# Patient Record
Sex: FEMALE | Race: WHITE | NOT HISPANIC OR LATINO | Employment: FULL TIME | URBAN - METROPOLITAN AREA
[De-identification: names, ages, dates, MRNs, and addresses within clinical notes are randomized per-mention and may not be internally consistent; named-entity substitution may affect disease eponyms.]

---

## 2020-10-27 ENCOUNTER — TRANSFERRED RECORDS (OUTPATIENT)
Dept: HEALTH INFORMATION MANAGEMENT | Facility: CLINIC | Age: 55
End: 2020-10-27

## 2020-10-28 ENCOUNTER — TRANSFERRED RECORDS (OUTPATIENT)
Dept: HEALTH INFORMATION MANAGEMENT | Facility: CLINIC | Age: 55
End: 2020-10-28

## 2020-10-29 ENCOUNTER — TRANSFERRED RECORDS (OUTPATIENT)
Dept: HEALTH INFORMATION MANAGEMENT | Facility: CLINIC | Age: 55
End: 2020-10-29

## 2020-11-02 ENCOUNTER — TRANSFERRED RECORDS (OUTPATIENT)
Dept: HEALTH INFORMATION MANAGEMENT | Facility: CLINIC | Age: 55
End: 2020-11-02

## 2020-11-13 ENCOUNTER — TRANSFERRED RECORDS (OUTPATIENT)
Dept: HEALTH INFORMATION MANAGEMENT | Facility: CLINIC | Age: 55
End: 2020-11-13

## 2020-11-30 ENCOUNTER — TRANSFERRED RECORDS (OUTPATIENT)
Dept: HEALTH INFORMATION MANAGEMENT | Facility: CLINIC | Age: 55
End: 2020-11-30

## 2020-12-02 ENCOUNTER — TRANSFERRED RECORDS (OUTPATIENT)
Dept: HEALTH INFORMATION MANAGEMENT | Facility: CLINIC | Age: 55
End: 2020-12-02

## 2020-12-15 ENCOUNTER — TRANSFERRED RECORDS (OUTPATIENT)
Dept: HEALTH INFORMATION MANAGEMENT | Facility: CLINIC | Age: 55
End: 2020-12-15

## 2020-12-18 ENCOUNTER — TRANSFERRED RECORDS (OUTPATIENT)
Dept: HEALTH INFORMATION MANAGEMENT | Facility: CLINIC | Age: 55
End: 2020-12-18

## 2020-12-30 ENCOUNTER — TRANSFERRED RECORDS (OUTPATIENT)
Dept: HEALTH INFORMATION MANAGEMENT | Facility: CLINIC | Age: 55
End: 2020-12-30

## 2020-12-30 LAB
ALT SERPL-CCNC: 44 IU/L (ref 5–45)
AST SERPL-CCNC: 26 IU/L (ref 5–45)
CREATININE (EXTERNAL): 0.74 MG/DL (ref 0.4–1.1)
GFR ESTIMATED (EXTERNAL): 91 ML/MIN/1.73M2
GLUCOSE (EXTERNAL): 100 MG/DL (ref 70–99)
POTASSIUM (EXTERNAL): 4.2 MMOL/L (ref 3.4–5)

## 2021-01-20 ENCOUNTER — TRANSFERRED RECORDS (OUTPATIENT)
Dept: HEALTH INFORMATION MANAGEMENT | Facility: CLINIC | Age: 56
End: 2021-01-20

## 2021-01-20 LAB
ALT SERPL-CCNC: 44 IU/L (ref 5–45)
AST SERPL-CCNC: 27 IU/L (ref 5–45)
CREATININE (EXTERNAL): 0.64 MG/DL (ref 0.4–1.1)
GFR ESTIMATED (EXTERNAL): 100 ML/MIN/1.73M2
GLUCOSE (EXTERNAL): 94 MG/DL (ref 70–99)
POTASSIUM (EXTERNAL): 4 MMOL/L (ref 3.4–5)

## 2021-02-03 ENCOUNTER — TRANSFERRED RECORDS (OUTPATIENT)
Dept: HEALTH INFORMATION MANAGEMENT | Facility: CLINIC | Age: 56
End: 2021-02-03

## 2021-02-23 LAB
ALT SERPL-CCNC: 37 IU/L (ref 5–45)
AST SERPL-CCNC: 50 IU/L (ref 5–45)
CREATININE (EXTERNAL): 0.65 MG/DL (ref 0.4–1.1)
GFR ESTIMATED (EXTERNAL): 100 ML/MIN/1.73M2
GLUCOSE (EXTERNAL): 85 MG/DL (ref 70–99)
POTASSIUM (EXTERNAL): 4.2 MMOL/L (ref 3.4–5)

## 2021-03-24 LAB
ALT SERPL-CCNC: 65 IU/L (ref 5–45)
AST SERPL-CCNC: 55 IU/L (ref 5–45)
CREATININE (EXTERNAL): 0.72 MG/DL (ref 0.4–1.1)
GFR ESTIMATED (EXTERNAL): 94 ML/MIN/1.73M2
GLUCOSE (EXTERNAL): 101 MG/DL (ref 70–99)
POTASSIUM (EXTERNAL): 3.9 MMOL/L (ref 3.4–5)

## 2021-04-07 ENCOUNTER — TRANSFERRED RECORDS (OUTPATIENT)
Dept: HEALTH INFORMATION MANAGEMENT | Facility: CLINIC | Age: 56
End: 2021-04-07

## 2021-04-07 LAB
ALT SERPL-CCNC: 43 IU/L (ref 5–45)
AST SERPL-CCNC: 43 IU/L (ref 5–45)
CREATININE (EXTERNAL): 0.76 MG/DL (ref 0.4–1.1)
GFR ESTIMATED (EXTERNAL): 88 ML/MIN/1.73M2
GLUCOSE (EXTERNAL): 78 MG/DL (ref 70–99)
POTASSIUM (EXTERNAL): 3.7 MMOL/L (ref 3.4–5)

## 2021-05-24 LAB
ALT SERPL-CCNC: 37 IU/L (ref 5–45)
AST SERPL-CCNC: 41 IU/L (ref 5–45)
CREATININE (EXTERNAL): 3.7 MG/DL (ref 3.4–5)
GLUCOSE (EXTERNAL): 92 MG/DL (ref 70–99)
POTASSIUM (EXTERNAL): 3.7 MMOL/L (ref 3.4–5)

## 2021-06-04 LAB
ALT SERPL-CCNC: 44 IU/L (ref 5–45)
AST SERPL-CCNC: 57 IU/L (ref 5–45)
CREATININE (EXTERNAL): 0.62 MG/DL (ref 0.4–1.1)
GLUCOSE (EXTERNAL): 98 MG/DL (ref 70–99)
POTASSIUM (EXTERNAL): 3.8 MMOL/L (ref 3.4–5)

## 2021-07-02 LAB
ALT SERPL-CCNC: 24 IU/L (ref 5–45)
AST SERPL-CCNC: 37 IU/L (ref 5–45)
CREATININE (EXTERNAL): 0.67 MG/DL (ref 0.4–1.1)
GLUCOSE (EXTERNAL): 95 MG/DL (ref 70–99)
POTASSIUM (EXTERNAL): 3.8 MMOL/L (ref 3.4–5)

## 2021-08-09 ENCOUNTER — TRANSFERRED RECORDS (OUTPATIENT)
Dept: HEALTH INFORMATION MANAGEMENT | Facility: CLINIC | Age: 56
End: 2021-08-09

## 2021-11-17 ENCOUNTER — TRANSFERRED RECORDS (OUTPATIENT)
Dept: HEALTH INFORMATION MANAGEMENT | Facility: CLINIC | Age: 56
End: 2021-11-17

## 2021-11-19 ENCOUNTER — TRANSFERRED RECORDS (OUTPATIENT)
Dept: HEALTH INFORMATION MANAGEMENT | Facility: CLINIC | Age: 56
End: 2021-11-19

## 2021-11-19 LAB
ALT SERPL-CCNC: 32 IU/L (ref 5–45)
AST SERPL-CCNC: 35 IU/L (ref 5–45)
CREATININE (EXTERNAL): 0.78 MG/DL (ref 0.4–1.1)
GLUCOSE (EXTERNAL): 92 MG/DL (ref 70–99)
POTASSIUM (EXTERNAL): 4.3 MMOL/L (ref 3.4–5)

## 2022-02-11 ENCOUNTER — TRANSFERRED RECORDS (OUTPATIENT)
Dept: HEALTH INFORMATION MANAGEMENT | Facility: CLINIC | Age: 57
End: 2022-02-11

## 2022-02-11 LAB
ALT SERPL-CCNC: 27 IU/L (ref 5–45)
AST SERPL-CCNC: 32 IU/L (ref 5–45)
CREATININE (EXTERNAL): 0.88 MG/DL (ref 0.4–1.1)
GLUCOSE (EXTERNAL): 102 MG/DL (ref 70–99)
POTASSIUM (EXTERNAL): 4 MMOL/L (ref 3.4–5)

## 2022-11-18 ENCOUNTER — TRANSFERRED RECORDS (OUTPATIENT)
Dept: HEALTH INFORMATION MANAGEMENT | Facility: CLINIC | Age: 57
End: 2022-11-18

## 2022-11-18 LAB
ALT SERPL-CCNC: 35 IU/L (ref 5–45)
AST SERPL-CCNC: 32 IU/L (ref 5–45)
CREATININE (EXTERNAL): 0.83 MG/DL (ref 0.4–1.1)
GFR ESTIMATED (EXTERNAL): 78 ML/MIN/1.73M2
GLUCOSE (EXTERNAL): 81 MG/DL (ref 70–99)
POTASSIUM (EXTERNAL): 4.7 MMOL/L (ref 3.4–5)

## 2022-11-30 ENCOUNTER — TRANSFERRED RECORDS (OUTPATIENT)
Dept: HEALTH INFORMATION MANAGEMENT | Facility: CLINIC | Age: 57
End: 2022-11-30

## 2022-12-07 ENCOUNTER — TRANSFERRED RECORDS (OUTPATIENT)
Dept: HEALTH INFORMATION MANAGEMENT | Facility: CLINIC | Age: 57
End: 2022-12-07

## 2023-01-10 ENCOUNTER — TRANSFERRED RECORDS (OUTPATIENT)
Dept: HEALTH INFORMATION MANAGEMENT | Facility: CLINIC | Age: 58
End: 2023-01-10

## 2023-01-13 ENCOUNTER — TRANSFERRED RECORDS (OUTPATIENT)
Dept: HEALTH INFORMATION MANAGEMENT | Facility: CLINIC | Age: 58
End: 2023-01-13

## 2023-01-13 ENCOUNTER — TELEPHONE (OUTPATIENT)
Dept: SURGERY | Facility: CLINIC | Age: 58
End: 2023-01-13
Payer: COMMERCIAL

## 2023-01-13 ENCOUNTER — TRANSCRIBE ORDERS (OUTPATIENT)
Dept: OTHER | Age: 58
End: 2023-01-13

## 2023-01-13 ENCOUNTER — MEDICAL CORRESPONDENCE (OUTPATIENT)
Dept: HEALTH INFORMATION MANAGEMENT | Facility: CLINIC | Age: 58
End: 2023-01-13

## 2023-01-13 DIAGNOSIS — D69.6 THROMBOCYTOPENIA, UNSPECIFIED (H): ICD-10-CM

## 2023-01-13 DIAGNOSIS — C19 MALIGNANT NEOPLASM OF RECTOSIGMOID JUNCTION (H): Primary | ICD-10-CM

## 2023-01-13 NOTE — TELEPHONE ENCOUNTER
Records Requested    Facility  [facility]  Fax: [phone]   Outcome * 1/13/23 3:39 PM Faxed urg req to [facility] for additional records to be faxed to the clinic before scheduling. - Danya    * 1/17/23 9:27 AM Faxed 2nd urg req to [facility] for additional records. - Danya    * 1/18/23 1:16 PM Records received from [facility] and sent to HIM to be scanned into the chart. - Danya    [facility]:  12/7/22 - Pelvic Biopsy (Case: [id number])  8/9/21 - Colon Biopsy (Case: [id number])  10/27/20 - Colon Biopsy (Case: [id number])  11/18/22 - CEA (2.6 ng/ml)    11/30/22, 11/18/22 - ONC OV with Dr. Lundy  11/19/21 - ONC OV with Dorita Franz, NP   6/10/21 - RAD ONC OV with Dr. Orlando    8/9/21 - OP Note for ABDOMINOPERINEAL RESECTION with Dr. Thornton   10/27/20 - Colonoscopy      1/10/23 - MRI Pelvis  12/7/22 - CT Pelvic Biopsy  11/30/22 - PET/CT  11/16/22 - CT Chest/Abd/Pelvis

## 2023-01-18 ENCOUNTER — PATIENT OUTREACH (OUTPATIENT)
Dept: SURGERY | Facility: CLINIC | Age: 58
End: 2023-01-18
Payer: COMMERCIAL

## 2023-01-18 ENCOUNTER — TRANSFERRED RECORDS (OUTPATIENT)
Dept: HEALTH INFORMATION MANAGEMENT | Facility: CLINIC | Age: 58
End: 2023-01-18
Payer: COMMERCIAL

## 2023-01-18 NOTE — PROGRESS NOTES
Sarah has a history of rectal cancer s/p JULIA and APR. She now has a pelvic sidewall recurrence. She stated that she will be getting a port placed next week and starting chemo. She does not have a chemo start date.     Discussed with Dr. Chong who does not think she needs chemo. Scheduled her for 2/7/2023. She is getting her port placed on the 26th. Will have Dr. Chong review scans if possible and try and connect with her oncologist.

## 2023-01-19 NOTE — TELEPHONE ENCOUNTER
Diagnosis, Referred by & from: Rectal Cancer Recurrence in Pelvic Sidewall   Appt date: 2023   NOTES STATUS DETAILS   OFFICE NOTE from referring provider Received [facility]:  21 - ONC OV with Dorita Franz NP   OFFICE NOTE from other specialist Received [facility]:  22, 22 - ONC OV with Dr. Lundy  6/10/21 - RAD ONC OV with Dr. Orlando   DISCHARGE SUMMARY from hospital N/A    DISCHARGE REPORT from the ER N/A    OPERATIVE REPORT Received [facility]:  21 - OP Note for ABDOMINOPERINEAL RESECTION with Dr. Thornton   MEDICATION LIST Received    LABS     ANAL PAP/CEA Received [facility]:  22 - CEA   BIOPSIES/PATHOLOGY RELATED TO DIAGNOSIS Received [facility]:  22 - Pelvic Biopsy (Case: [id number])  21 - Colon Biopsy (Case: [id number])  10/27/20 - Colon Biopsy (Case: [id number])   DIAGNOSTIC PROCEDURES     COLONOSCOPY Received [facility]:  10/27/20 - Colonoscopy   IMAGING (DISC & REPORT)      CT Received [facility]:  22 - CT Pelvis  22 - PET  21 - CT Chest/Abd/Pelvis  21 - CT Chest/Abd/Pelvis  12/15/20 - CTA Chest  10/29/20 - CT Chest/Abd/Pelvis   MRI Received [facility]:  1/10/23 - MRI Pelvis  21 - MRI Pelvis  20 - MRI Abdomen  20 - MRI Pelvis     Records Requested  23    Facility  [facility] ([facility])  Fax: [phone]   Outcome * 23 9:58 AM Faxed urg req to [facility] for imaging disc to be Fed'Exd to the clinic. - Danya    * 23 1:26 PM Received imaging disc from [facility] sent to [facility]- and requested it be uploaded urgently. - Danya    * 23 8:24 AM Talked to uploader and they are working on un-ecyrpting the discs to get them uploaded into PACs. - Danya    Trackin     Action 23 [facility] 12:54PM   Action Taken  CSS unable to retreive images from disc. Discs sent to 2nd Floor to see if they are able to retreive images. Requestor updated about status.      Records Requested    [facility]  Fax: [phone]   Outcome * 23  10:52 AM Faxed urg req to [facility] for pathology to be Fed'Exd to the clinic. - Danya    Trackin

## 2023-01-30 ENCOUNTER — HOSPITAL ENCOUNTER (INPATIENT)
Dept: GENERAL RADIOLOGY | Facility: CLINIC | Age: 58
Discharge: HOME | End: 2023-01-30
Attending: SURGERY
Payer: COMMERCIAL

## 2023-01-30 DIAGNOSIS — C20 RECTAL ADENOCARCINOMA (H): ICD-10-CM

## 2023-01-30 DIAGNOSIS — C20 RECTAL ADENOCARCINOMA (H): Primary | ICD-10-CM

## 2023-01-30 PROCEDURE — 72197 MRI PELVIS W/O & W/DYE: CPT | Mod: 26 | Performed by: RADIOLOGY

## 2023-01-31 ENCOUNTER — MYC MEDICAL ADVICE (OUTPATIENT)
Dept: SURGERY | Facility: CLINIC | Age: 58
End: 2023-01-31
Payer: COMMERCIAL

## 2023-02-03 NOTE — TELEPHONE ENCOUNTER
Records received    February 3, 2023 11:47 AM  BENY9   Facility  [facility] - Pathology Dept   [address] [place] [address]  Ph. [phone] / fx. [phone]   Outcome Received path: 12/7/22 [id number]

## 2023-02-06 LAB
PATH REPORT.COMMENTS IMP SPEC: NORMAL
PATH REPORT.FINAL DX SPEC: NORMAL
PATH REPORT.GROSS SPEC: NORMAL
PATH REPORT.MICROSCOPIC SPEC OTHER STN: NORMAL
PATH REPORT.RELEVANT HX SPEC: NORMAL
PATH REPORT.RELEVANT HX SPEC: NORMAL
PATH REPORT.SITE OF ORIGIN SPEC: NORMAL

## 2023-02-06 PROCEDURE — 88321 CONSLTJ&REPRT SLD PREP ELSWR: CPT | Performed by: PATHOLOGY

## 2023-02-07 ENCOUNTER — PRE VISIT (OUTPATIENT)
Dept: SURGERY | Facility: CLINIC | Age: 58
End: 2023-02-07

## 2023-02-07 NOTE — PROGRESS NOTES
Colon and Rectal Surgery Clinic Note    RE: Sarah Portillo.  : 1965.  BENJAMIN: 2023.    Reason for visit: Recurrent rectal adenocarcinoma.     HPI: Sarah Portillo is a 57 year old female who presents today for a recurrence of her rectal adenocarcinoma. She was diagnosed with a mT3bN0 moderally differentiated distal rectal adenocarcinoma in 2020. MMR intact. She underwent JULIA with FOLFOX6 x 8 cycles followed by long course chemoradiotherapy. She sequently underwent abdominoperineal resection on 2021 by Dr. Chava Rosario in [place].  Postoperative recovery was largely uneventful.  Final pathology showed a 2.5 cm tumor with negative surgical margins (closest margin was 3.0 cm) with 0 out of 24 lymph nodes. During follow-up, she was diagnosed with a local recurrence in 2022. CT guided biopsy demonstrated metastatic adenocaricnoma consistent with colorectal primary in 2022. PD-L1 expressed in the setting of normal MMR expression was intermediate, Pan -TRK not expressed, HER 2 negative, and FISH analysis negative. She had a MRI pelvis on 1/10/2023 which revealed a right posterior pelvis sidewall/pelvic floor recurrence measuring 2.7 x 2.0 x 3.4cm with tethering of the cervix. PET scan on 2023 demonstrated a right posterolateral pelvic sidewall soft tissue mass measuring 2.2 cm with FDG update. Last CEA in 2022 was 2.6.     Sarah has a history of DVT and pulmonary embolism.  She was on warfarin for approximately 1 year and did undergo placement of an IVC filter for surgery but this has been since removed.  She is not on any anticoagulation at this time.         CEA (2022): 2.6       Surgical Pathology (2023):      PD-L1 expressed  Pan -TRK not expressed   HER 2 negative   FISH analysis negative       [facility] Pathology Consult:  CASE FROM [facility], [place], [place] ([id number], OBTAINED 2022):  Pelvic mass, CT-guided core  "biopsy:  -Metastatic moderately differentiated adenocarcinoma       MRI Pelvis (1/10/2023):        [facility] MRI overread:   2.4 cm heterogenous irregular enhancing soft tissue mass located  within right posterior deep pelvis adjacent to right internal iliac  vasculature with associated involvement of right pyriformis and ischio  gluteus musculature and tethering of cervix along the medial aspect.  Findings consistent with biopsy proven adenocarcinoma of colorectal  origin.      PET Scan (1/13/2023):      Medical history:  1. Hx of PE in 2020.     Surgical history:  1. APR on 8/9/2021.     Family history:  No family history on file.    Medications:  Current Outpatient Medications   Medication Sig Dispense Refill     levothyroxine (SYNTHROID/LEVOTHROID) 150 MCG tablet Take 1 tablet by mouth daily at 2 pm         Allergies:  No Known Allergies    Social history:   Social History     Tobacco Use     Smoking status: Never     Smokeless tobacco: Never   Substance Use Topics     Alcohol use: Not on file     Marital status: .    Physical Examination:  BP (!) 159/98 (BP Location: Right arm, Patient Position: Sitting, Cuff Size: Adult Large)   Pulse 65   Ht 6' 1\"   Wt 330 lb   SpO2 97%   BMI 43.54 kg/m    General: Well hydrated. No acute distress.  Abdomen: Soft, NT, left-sided ostomy viable and functioning. No masses or inguinal adenopathy palpated.   Perianal external examination:  Perianal skin: APR wound completely healed with no masses or nodularity.  No perineal hernia upon Valsalva.    ASSESSMENT  57 year old female with mT3bN0/pT3N0 distal rectal adenocarcinoma status post JULIA (systemic chemotherapy followed by long course chemoradiotherapy), followed by abdominoperineal resection with negative surgical margins and 0 out of 24 lymph nodes; now with biopsy-proven local recurrence 16 months after treatment. No clear evidence of distant metastatic disease. Local recurrence seems to be involving the right " pelvic sidewall and cervix. Risks, benefits, and alternatives of operative treatment were thoroughly discussed with the patient, he/she understands these well and agrees to proceed.  All questions were answered to her satisfaction.    PLAN  1.  We will discuss at multidisciplinary rectal cancer conference.  2.  Review outside imaging and pathology.  3.  Schedule bilateral lower extremity venous duplex at least 2 weeks before surgery.    4.  Schedule cystoscopy with bilateral ureteral stents, excision of pelvic recurrence, total abdominal hysterectomy with bilateral salpingo-oophorectomy.  Will need PAC and labs.    60 minutes spent on the date of the encounter doing chart review, history and exam, imaging review, documentation and further activities as noted above.    Long Gomez MD, MSc, FACS, FASCRS.    Chief, Division of Colon & Rectal Surgery  Stanley M. Goldberg, MD Endowed Chair, Colon & Rectal Surgery  Department of Surgery  [facility]      Referring Provider:  Chava Rosario MD    Primary Care Provider:  No primary care provider on file.    CC:  MD Fox Solis, DO

## 2023-02-12 ENCOUNTER — HEALTH MAINTENANCE LETTER (OUTPATIENT)
Age: 58
End: 2023-02-12

## 2023-02-13 ENCOUNTER — TUMOR CONFERENCE (OUTPATIENT)
Dept: ONCOLOGY | Facility: CLINIC | Age: 58
End: 2023-02-13
Payer: COMMERCIAL

## 2023-02-13 ENCOUNTER — OFFICE VISIT (OUTPATIENT)
Dept: SURGERY | Facility: CLINIC | Age: 58
End: 2023-02-13
Payer: COMMERCIAL

## 2023-02-13 VITALS
WEIGHT: 293 LBS | HEART RATE: 65 BPM | DIASTOLIC BLOOD PRESSURE: 98 MMHG | HEIGHT: 72 IN | OXYGEN SATURATION: 97 % | BODY MASS INDEX: 39.68 KG/M2 | SYSTOLIC BLOOD PRESSURE: 159 MMHG

## 2023-02-13 DIAGNOSIS — C19 MALIGNANT NEOPLASM OF RECTOSIGMOID JUNCTION (H): ICD-10-CM

## 2023-02-13 DIAGNOSIS — C20 LOCAL RECURRENCE OF RECTAL CANCER (H): ICD-10-CM

## 2023-02-13 DIAGNOSIS — D69.6 THROMBOCYTOPENIA, UNSPECIFIED (H): ICD-10-CM

## 2023-02-13 DIAGNOSIS — E66.01 MORBID OBESITY (H): ICD-10-CM

## 2023-02-13 DIAGNOSIS — C20 LOCAL RECURRENCE OF RECTAL CANCER (H): Primary | ICD-10-CM

## 2023-02-13 DIAGNOSIS — C20 RECTAL ADENOCARCINOMA (H): ICD-10-CM

## 2023-02-13 DIAGNOSIS — C19 MALIGNANT NEOPLASM OF RECTOSIGMOID JUNCTION (H): Primary | ICD-10-CM

## 2023-02-13 PROCEDURE — 99205 OFFICE O/P NEW HI 60 MIN: CPT | Performed by: COLON & RECTAL SURGERY

## 2023-02-13 RX ORDER — LEVOTHYROXINE SODIUM 150 UG/1
1 TABLET ORAL EVERY MORNING
COMMUNITY
Start: 2023-01-10

## 2023-02-13 ASSESSMENT — PAIN SCALES - GENERAL: PAINLEVEL: NO PAIN (0)

## 2023-02-13 NOTE — PATIENT INSTRUCTIONS
Follow up:  Schedule surgery with Dorita  [phone]  Pre-op physical and labs   No mechanical bowel prep      Please call with any questions or concerns regarding your clinic visit today.    It is a pleasure being involved in your health care.    Contacts post-consultation depending on your need:    Radiology Appointments [phone]    Schedule Clinic Appointments [phone] # 1   M-F 7:30 - 5 pm    JAMIE Chacon [phone]    Clinic Fax Number [phone]    Surgery Scheduling [phone]    My Chart is available 24 hours a day and is a secure way to access your records and communicate with your care team.  I strongly recommend signing up if you haven't already done so, if you are comfortable with computers.  If you would like to inquire about this or are having problems with My Chart access, you may call [phone] or go online at [email or web address].  Please allow at least 24 hours for a response and extra time on weekends and Holidays.

## 2023-02-13 NOTE — NURSING NOTE
"Chief Complaint   Patient presents with     Cancer     Rectal cancer       Vitals:    02/13/23 0850   BP: (!) 159/98   BP Location: Right arm   Patient Position: Sitting   Cuff Size: Adult Large   Pulse: 65   SpO2: 97%   Weight: 330 lb   Height: 6' 1\"       Body mass index is 43.54 kg/m .    Alethea Gutierrez CMA    "

## 2023-02-13 NOTE — LETTER
2023       RE: Sarah Portillo  [address]  [place] [address]     Dear Colleague,    Thank you for referring your patient, Sarah Portillo, to the [facility]ON AND RE[facility] [place] at [facility]. Please see a copy of my visit note below.    Colon and Rectal Surgery Clinic Note    RE: Sarah Portillo.  : 1965.  BENJAMIN: 2023.    Reason for visit: Recurrent rectal adenocarcinoma.     HPI: Sarah Portillo is a 57 year old female who presents today for a recurrence of her rectal adenocarcinoma. She was diagnosed with a mT3bN0 moderally differentiated distal rectal adenocarcinoma in 2020. MMR intact. She underwent JULIA with FOLFOX6 x 8 cycles followed by long course chemoradiotherapy. She sequently underwent abdominoperineal resection on 2021 by Dr. Chava Rosario in [place].  Postoperative recovery was largely uneventful.  Final pathology showed a 2.5 cm tumor with negative surgical margins (closest margin was 3.0 cm) with 0 out of 24 lymph nodes. During follow-up, she was diagnosed with a local recurrence in 2022. CT guided biopsy demonstrated metastatic adenocaricnoma consistent with colorectal primary in 2022. PD-L1 expressed in the setting of normal MMR expression was intermediate, Pan -TRK not expressed, HER 2 negative, and FISH analysis negative. She had a MRI pelvis on 1/10/2023 which revealed a right posterior pelvis sidewall/pelvic floor recurrence measuring 2.7 x 2.0 x 3.4cm with tethering of the cervix. PET scan on 2023 demonstrated a right posterolateral pelvic sidewall soft tissue mass measuring 2.2 cm with FDG update. Last CEA in 2022 was 2.6.     Sarah has a history of DVT and pulmonary embolism.  She was on warfarin for approximately 1 year and did undergo placement of an IVC filter for surgery but this has been since removed.  She is not on any anticoagulation at this time.    "      CEA (11/18/2022): 2.6       Surgical Pathology (12/7/2023):      PD-L1 expressed  Pan -TRK not expressed   HER 2 negative   FISH analysis negative       [facility] Pathology Consult:  CASE FROM [place], [place] ([id number], OBTAINED 12/07/2022):  Pelvic mass, CT-guided core biopsy:  -Metastatic moderately differentiated adenocarcinoma       MRI Pelvis (1/10/2023):        [facility] MRI overread:   2.4 cm heterogenous irregular enhancing soft tissue mass located  within right posterior deep pelvis adjacent to right internal iliac  vasculature with associated involvement of right pyriformis and ischio  gluteus musculature and tethering of cervix along the medial aspect.  Findings consistent with biopsy proven adenocarcinoma of colorectal  origin.      PET Scan (1/13/2023):      Medical history:  1. Hx of PE in 2020.     Surgical history:  1. APR on 8/9/2021.     Family history:  No family history on file.    Medications:  Current Outpatient Medications   Medication Sig Dispense Refill     levothyroxine (SYNTHROID/LEVOTHROID) 150 MCG tablet Take 1 tablet by mouth daily at 2 pm         Allergies:  No Known Allergies    Social history:   Social History     Tobacco Use     Smoking status: Never     Smokeless tobacco: Never   Substance Use Topics     Alcohol use: Not on file     Marital status: .    Physical Examination:  BP (!) 159/98 (BP Location: Right arm, Patient Position: Sitting, Cuff Size: Adult Large)   Pulse 65   Ht 6' 1\"   Wt 330 lb   SpO2 97%   BMI 43.54 kg/m    General: Well hydrated. No acute distress.  Abdomen: Soft, NT, left-sided ostomy viable and functioning. No masses or inguinal adenopathy palpated.   Perianal external examination:  Perianal skin: APR wound completely healed with no masses or nodularity.  No perineal hernia upon Valsalva.    ASSESSMENT  57 year old female with mT3bN0/pT3N0 distal rectal adenocarcinoma status post JULIA (systemic chemotherapy followed " by long course chemoradiotherapy), followed by abdominoperineal resection with negative surgical margins and 0 out of 24 lymph nodes; now with biopsy-proven local recurrence 16 months after treatment. No clear evidence of distant metastatic disease. Local recurrence seems to be involving the right pelvic sidewall and cervix. Risks, benefits, and alternatives of operative treatment were thoroughly discussed with the patient, he/she understands these well and agrees to proceed.  All questions were answered to her satisfaction.    PLAN  1.  We will discuss at multidisciplinary rectal cancer conference.  2.  Review outside imaging and pathology.  3.  Schedule bilateral lower extremity venous duplex at least 2 weeks before surgery.    4.  Schedule cystoscopy with bilateral ureteral stents, excision of pelvic recurrence, total abdominal hysterectomy with bilateral salpingo-oophorectomy.  Will need PAC and labs.    60 minutes spent on the date of the encounter doing chart review, history and exam, imaging review, documentation and further activities as noted above.    Long Gomez MD, MSc, FACS, FASCRS.    Chief, Division of Colon & Rectal Surgery  Stanley M. Goldberg, MD Endowed Chair, Colon & Rectal Surgery  Department of Surgery  [facility]      Referring Provider:  Chava Rosario MD    Primary Care Provider:  No primary care provider on file.    CC:  MD Fox Solis, DO        Again, thank you for allowing me to participate in the care of your patient.      Sincerely,    Long Gomez MD

## 2023-02-13 NOTE — LETTER
Date:February 13, 2023      Provider requested that no letter be sent. Do not send.       [facility]

## 2023-02-13 NOTE — TUMOR CONFERENCE
Tumor Conference Information  Tumor Conference: Colorectal  Specialties Present: Medical oncology, Radiation oncology, Pathology, Radiology, Surgery  Patient Status: Retrospective  Stage: rectal  Treatment to Date: Chemotherapy, Surgery, Radiation  Clinical Trials: Not discussed  Genetic Testing Discussed/Recommended?: No  Supportive Care Services Discussed/Recommended?: No  Recommended Plan: Follows evidence-based guidelines (Comment: surgery)  Did the review exceed 30 minutes?: did not           Documentation / Disclaimer Cancer Tumor Board Note  Cancer tumor board recommendations do not override what is determined to be reasonable care and treatment, which is dependent on the circumstances of a patient's case; the patient's medical, social, and personal concerns; and the clinical judgment of the oncologist [physician].

## 2023-02-14 ENCOUNTER — PREP FOR PROCEDURE (OUTPATIENT)
Dept: SURGERY | Facility: CLINIC | Age: 58
End: 2023-02-14
Payer: COMMERCIAL

## 2023-02-14 ENCOUNTER — PATIENT OUTREACH (OUTPATIENT)
Dept: ONCOLOGY | Facility: CLINIC | Age: 58
End: 2023-02-14
Payer: COMMERCIAL

## 2023-02-14 ENCOUNTER — TELEPHONE (OUTPATIENT)
Dept: SURGERY | Facility: CLINIC | Age: 58
End: 2023-02-14
Payer: COMMERCIAL

## 2023-02-14 DIAGNOSIS — C19 MALIGNANT NEOPLASM OF RECTOSIGMOID JUNCTION (H): Primary | ICD-10-CM

## 2023-02-14 NOTE — PROGRESS NOTES
New Patient Hematology / Oncology Nurse Navigator Note     Referral Date: 2/14/23    Referring provider: Per IB message from Tari//Dr. Gomez, combo surgery planned 3/9    Referring Clinic/Organization: [facility]     Referred to: GynOnc    Requested provider (if applicable): Dr. Viveros     Evaluation for : Multi-surgeon case: cystoscopy with bilateral ureteral stents, total abdominal hysterectomy with bilateral salpingo-oophorectomy       Clinical History (per Nurse review of records provided):      Office Visit yesterday with Dr. Gomez -- BOOKMARKED    1/30 PET/MR -- BOOKMARKED    Clinical Assessment / Barriers to Care (Per Nurse):  Pt lives in [place] ([place] hours away)      Records Location: [facility]     Records Needed:   N/A    Additional testing needed prior to consult:   N/A    Referral updates and Plan:   OUTGOING CALL to pt:  Introduced my role as nurse navigator with [facility] Hematology/Oncology dept and that we have recd the referral for surgical consult with Dr. Viveros.  Pt confirms they are aware of the referral and ready to schedule. Pt lives 5 hours away in [place] and will not be in the [facility] until the day before her surgery.  Provided contact information if future questions arise.     -Transferred pt to NPS line [phone] to schedule appt per scheduling instructions.    Will check with Dr. Viveros to see if she's licensed in [place] and if not patient can cross to [place] for virtual or come to [place] for in-person surgery consult.       Kelsey Sterling, BSN, RN, PHN, OCN  Hematology/Oncology Nurse Navigator  [facility]  [phone]

## 2023-02-15 NOTE — TELEPHONE ENCOUNTER
FUTURE VISIT INFORMATION      SURGERY INFORMATION:    Date: 3/9/23    Location: uu or    Surgeon:  Dima Arnold MD Gaertner, MD Jackeline Cheng, Bernadette Bird MD    Anesthesia Type:  general    Procedure: CYSTOSCOPY, WITH URETERAL STENT INSERTION LAPAROTOMY, OPEN EXCISION OF PELVIC FLOOR RECURRENCE HYSTERECTOMY, TOTAL, ABDOMINAL, WITH SALPINGO-OOPHORECTOMY    RECORDS REQUESTED FROM:       Pertinent Medical History: None

## 2023-02-17 ENCOUNTER — TRANSFERRED RECORDS (OUTPATIENT)
Dept: HEALTH INFORMATION MANAGEMENT | Facility: CLINIC | Age: 58
End: 2023-02-17
Payer: COMMERCIAL

## 2023-02-24 ENCOUNTER — TEAM CONFERENCE (OUTPATIENT)
Dept: SURGERY | Facility: CLINIC | Age: 58
End: 2023-02-24
Payer: COMMERCIAL

## 2023-02-24 NOTE — CONFIDENTIAL NOTE
COLON AND RECTAL SURGERY HUDDLE:    Patient was reviewed in preporation for their surgery the following was reviewed and has been completed:    Surgeon: Dr. Long Gomez    Surgery & Date: 3/9      Procedure Laterality Anesthesia   LAPAROTOMY, OPEN EXCISION OF PELVIC FLOOR RECURRENCE               Last MD Note: reviewed    Anesthesia Type: General    Other Providers: Yes    PAC: Yes3/8    WOC: No    Labs: Yes3/8    Bowel Prep: s none    Packet: Yes- need    Imaging: No    Post-Op Appointments: N/A    COVID: N/A    Pre op soap: Yes Questions about shower: no    Is patient on TPN?: No   If yes, I contacted the TPN pharmacist by paging the  pharmacy at [phone] or calling [phone]. I also contacted Shannon HERNANDEZ with inpatient colon and rectal team.     Pre op call complete: Yes

## 2023-02-24 NOTE — TELEPHONE ENCOUNTER
On 2/14/2023:  Spoke with patient via phone, completed the following scheduling, then later sent Surgery Packet to patient via MyChart and Mail including related scheduling information and instructions:    Your surgery is scheduled:    Date: Thursday March 9, 2023  ________________________________    Time: 7:30 AM*  ________________________________    Please arrive at:  5:30 AM*  ______________________    Surgeons' Names:  - Dr. Long Gomez  - Dr. Bernadette Church  with Urologist Dr. Arnold for Cystoscopy and Ureteral Stent Insertion  ____________________________________      Pre-Op Physical Fax Numbers:         [facility] Pre-Admissions  [facility] Fax:  [phone] / Phone:  [phone]        Your surgery is located at:      [facility]      [facility]      [address])      [place] [address]      [phone]      [email or web address]     *Times are tentative and may change. You can expect a call from the pre-admission nurses to confirm arrival and surgery start times if the times should change.     Required: Yes, you will need a  18 years or older to drive you home from your procedure as well as stay with you for 24 hours after your procedure, if you are discharged the same day as your procedure.    Surgery: CYSTOSCOPY, WITH URETERAL STENT INSERTION; LAPAROTOMY, OPEN EXCISION OF PELVIC FLOOR RECURRENCE; HYSTERECTOMY, TOTAL, ABDOMINAL, WITH SALPINGO-OOPHORECTOMY    Upcoming Related Appointments:     Mar 07, 2023  1:00 PM  New Patient with Bernadette Fields MD  [facility] ([facility] )   [phone]    [address] [place] [address]     Mar 08, 2023  1:30 PM  (Arrive by 1:15 PM)  PAC EVALUATION w/ DIDI Mckenna CNP  [facility]  [place] )   [phone]    [address] [facility] [address]     Mar 08, 2023  3:00 PM  LAB with [facility] LAB  [facility] ) [phone]    [address] [facility] [address]     Mar 29, 2023 10:00 AM  (Arrive by 9:45 AM)  Post Op with Diane Garrison PA-C  [facility] Colon and Rectal Surgery Clinic [facility] ) [phone]    [address] [facility] [address]     May 01, 2023  3:15 PM  (Arrive by 3:00 PM)  Post Op with Long Gomez MD  [facility] Colon and Rectal Surgery Clinic [place] [facility] ) [phone]    [address] [facility] [address]     NO BOWEL PREP

## 2023-02-27 NOTE — TELEPHONE ENCOUNTER
RECORDS STATUS - ALL OTHER DIAGNOSIS      RECORDS RECEIVED FROM: [facility], [facility]   DATE RECEIVED: 02/27/23   NOTES STATUS DETAILS   OFFICE NOTE from referring provider EPIC 02/13/23: Dr. Long Gomez   OFFICE NOTE from medical oncologist External: Rubi Khan 11/30/22: Dr. Pablo Lundy   OPERATIVE REPORT External: [facility] 08/09/21: robotic abdominoperineal resection   MEDICATION LIST [facility]    LABS     PATHOLOGY REPORTS Report in EPIC Path Consult:  02/06/23: [id number]   ANYTHING RELATED TO DIAGNOSIS External: [facility] Most recent 11/18/22   GENONOMIC TESTING     TYPE: External: [employer] 12/07/22: [id number]   IMAGING (NEED IMAGES & REPORT)     CT SCANS PACS 12/07/22: CT Soft Tissue  11/16/22-10/29/20: CT Chest Abd Pel   MRI PACS 07/12/21-11/02/20: MR Pelvis   PET PACS 11/30/21: PET CT Skull

## 2023-02-28 ENCOUNTER — TELEPHONE (OUTPATIENT)
Dept: UROLOGY | Facility: CLINIC | Age: 58
End: 2023-02-28
Payer: COMMERCIAL

## 2023-02-28 DIAGNOSIS — Z01.818 PREOPERATIVE EXAMINATION: Primary | ICD-10-CM

## 2023-02-28 NOTE — TELEPHONE ENCOUNTER
Pt called and notified that she will need to do a urine culture before her procedure.     Message routed to provider.

## 2023-03-02 ENCOUNTER — MEDICAL CORRESPONDENCE (OUTPATIENT)
Dept: HEALTH INFORMATION MANAGEMENT | Facility: CLINIC | Age: 58
End: 2023-03-02
Payer: COMMERCIAL

## 2023-03-02 ENCOUNTER — TELEPHONE (OUTPATIENT)
Dept: SURGERY | Facility: CLINIC | Age: 58
End: 2023-03-02
Payer: COMMERCIAL

## 2023-03-02 NOTE — TELEPHONE ENCOUNTER
Spoke to pt. Informed pt that culture can be done where she lives. She confirmed her local provider will place orders. Provided fax number to have results sent to.     Tiffany Reddy, MSN RN

## 2023-03-02 NOTE — TELEPHONE ENCOUNTER
Action 03/02/23 11:42 AM   Action Taken  * Imaging disc received from [facility] and sent to [facility] to be uploaded into PACs. - Danya

## 2023-03-02 NOTE — TELEPHONE ENCOUNTER
[facility] Call Center    Phone Message    May a detailed message be left on voicemail: yes     Reason for Call: The patient called back regarding urine culture before procedure next week. She says she has been waiting for a call back to know if she can have it done where she lives, but has not heard. Please contact patient. Thank you.     Action Taken: Message routed to:  [facility] ([facility] Urology    Travel Screening: Not Applicable

## 2023-03-03 ENCOUNTER — TRANSFERRED RECORDS (OUTPATIENT)
Dept: HEALTH INFORMATION MANAGEMENT | Facility: CLINIC | Age: 58
End: 2023-03-03
Payer: COMMERCIAL

## 2023-03-06 ENCOUNTER — PREP FOR PROCEDURE (OUTPATIENT)
Dept: SURGERY | Facility: CLINIC | Age: 58
End: 2023-03-06
Payer: COMMERCIAL

## 2023-03-06 ENCOUNTER — PREP FOR PROCEDURE (OUTPATIENT)
Dept: UROLOGY | Facility: CLINIC | Age: 58
End: 2023-03-06
Payer: COMMERCIAL

## 2023-03-06 DIAGNOSIS — C20 LOCAL RECURRENCE OF RECTAL CANCER (H): Primary | ICD-10-CM

## 2023-03-07 ENCOUNTER — PRE VISIT (OUTPATIENT)
Dept: ONCOLOGY | Facility: CLINIC | Age: 58
End: 2023-03-07
Payer: COMMERCIAL

## 2023-03-07 ENCOUNTER — ANESTHESIA EVENT (OUTPATIENT)
Dept: SURGERY | Facility: CLINIC | Age: 58
End: 2023-03-07
Payer: COMMERCIAL

## 2023-03-07 ENCOUNTER — ONCOLOGY VISIT (OUTPATIENT)
Dept: ONCOLOGY | Facility: CLINIC | Age: 58
End: 2023-03-07
Attending: COLON & RECTAL SURGERY
Payer: COMMERCIAL

## 2023-03-07 ENCOUNTER — PREP FOR PROCEDURE (OUTPATIENT)
Dept: ONCOLOGY | Facility: CLINIC | Age: 58
End: 2023-03-07

## 2023-03-07 VITALS
TEMPERATURE: 98.3 F | HEIGHT: 71 IN | WEIGHT: 293 LBS | RESPIRATION RATE: 18 BRPM | HEART RATE: 52 BPM | BODY MASS INDEX: 41.02 KG/M2 | OXYGEN SATURATION: 98 % | SYSTOLIC BLOOD PRESSURE: 135 MMHG | DIASTOLIC BLOOD PRESSURE: 88 MMHG

## 2023-03-07 DIAGNOSIS — C19 MALIGNANT NEOPLASM OF RECTOSIGMOID JUNCTION (H): ICD-10-CM

## 2023-03-07 LAB
ABO/RH(D): NORMAL
ANTIBODY SCREEN: NEGATIVE
SPECIMEN EXPIRATION DATE: NORMAL

## 2023-03-07 PROCEDURE — 99203 OFFICE O/P NEW LOW 30 MIN: CPT | Performed by: OBSTETRICS & GYNECOLOGY

## 2023-03-07 PROCEDURE — 99212 OFFICE O/P EST SF 10 MIN: CPT | Performed by: OBSTETRICS & GYNECOLOGY

## 2023-03-07 ASSESSMENT — PAIN SCALES - GENERAL: PAINLEVEL: NO PAIN (0)

## 2023-03-07 NOTE — PROGRESS NOTES
"Consult Notes on Referred Patient        Dr. Long Gomez MD  [address]  [place] [address]       RE: Sarah Portillo  : 1965  BENJAMIN: Mar 7, 2023    Dear Dr. Long Gomez:    I had the pleasure of seeing your patient Sarah Portillo here at the Gynecologic Cancer Clinic at the [facility] on 3/7/2023.  As you know she is a very pleasant 57 year old woman with a diagnosis of recurrent rectal adenocarcinoma.  Given these findings she was subsequently sent to the Gynecologic Cancer Clinic for new patient consultation.  She is accompanied today by her .      Patient initially diagnosed with rectal cancer in .  She underwent chemotherapy followed by chemoradiation therapy and surgery. In , she was unfornately diagnosed with an isolated recurrence at the right pelvic sidewall.  She has seen Dr Gomez who recommended surgical debulking.    23:  MRI pelvis (personally reviewed):  2.4 cm heterogenous irregular enhancing soft tissue mass located within right posterior deep pelvis adjacent to right internal iliac vasculature with associated involvement of right pyriformis and ischiogluteus musculature and tethering of cervix along the medial aspect. Findings consistent with biopsy proven adenocarcinoma of colorectal origin.    23: PET/CT (Personally reviewed):  Isolated recurrence in the pelvis (formal [facility] read pending)        Past Medical History:  Past Medical History:   Diagnosis Date     Colon cancer (H)      Hypothyroidism        Past Surgical History:  Past Surgical History:   Procedure Laterality Date     LOW ANTERIOR BOWEL RESECTION         Health Maintenance:  Last Pap Smear: No history of abnormal pap smears        Physical Exam:   /88 (Cuff Size: Thigh)   Pulse 52   Temp 98.3  F (36.8  C) (Tympanic)   Resp 18   Ht 1.803 m (5' 11\")   Wt (!) 151.5 kg (334 lb)   SpO2 98%   BMI 46.58 kg/m    Body " mass index is 46.58 kg/m .  GENERAL: Healthy, alert and no distress  EYES: Eyes grossly normal to inspection.  No discharge or erythema, or obvious scleral/conjunctival abnormalities.  RESP: No audible wheeze, cough, or visible cyanosis.  No visible retractions or increased work of breathing.    SKIN: Visible skin clear. No significant rash, abnormal pigmentation or lesions.  NEURO: Cranial nerves grossly intact.  Mentation and speech appropriate for age.  PSYCH: Mentation appears normal, affect normal/bright, judgement and insight intact, normal speech and appearance well-groomed.     Labs:  None      Assessment:    Sarah Portillo is a 57 year old woman with a diagnosis of recurrent rectal adenocarcinoma.     A total of 20 minutes was spent on patient care today.       Plan:     1.)    Recurrent rectal adenocarcinoma-pt is planned to undergo debulking procedure with Dr Gomez.  It is highly likely that this will require hysterectomy for adequate resection.  Given her age, I would also recommend removal of bilateral ovaries at the time of the procedure.  I will remain available to assist with the procedure if Dr Gomez deems it is appropriate to proceed.    2.) Pre-op teaching was completed today.        Thank you for allowing us to participate in the care of your patient.         Sincerely,    Bernadette Viveros MD  Gynecologic Oncology  [facility]       [employer] SPICER

## 2023-03-07 NOTE — LETTER
3/7/2023         RE: Sarah Portillo  [address]  [place] [address]        Dear Colleague,    Thank you for referring your patient, Sarah Portillo, to the [facility]. Please see a copy of my visit note below.    Consult Notes on Referred Patient        Dr. Long Gomez MD  [address]  [place] [address]       RE: Sarah Portillo  : 1965  BENJAMIN: Mar 7, 2023    Dear Dr. Long Gomez:    I had the pleasure of seeing your patient Sarah Portillo here at the Gynecologic Cancer Clinic at the [facility] on 3/7/2023.  As you know she is a very pleasant 57 year old woman with a diagnosis of recurrent rectal adenocarcinoma.  Given these findings she was subsequently sent to the Gynecologic Cancer Clinic for new patient consultation.  She is accompanied today by her .      Patient initially diagnosed with rectal cancer in .  She underwent chemotherapy followed by chemoradiation therapy and surgery. In , she was unfornately diagnosed with an isolated recurrence at the right pelvic sidewall.  She has seen Dr Gomez who recommended surgical debulking.    23:  MRI pelvis (personally reviewed):  2.4 cm heterogenous irregular enhancing soft tissue mass located within right posterior deep pelvis adjacent to right internal iliac vasculature with associated involvement of right pyriformis and ischiogluteus musculature and tethering of cervix along the medial aspect. Findings consistent with biopsy proven adenocarcinoma of colorectal origin.    23: PET/CT (Personally reviewed):  Isolated recurrence in the pelvis (formal [facility] read pending)        Past Medical History:  Past Medical History:   Diagnosis Date     Colon cancer (H)      Hypothyroidism        Past Surgical History:  Past Surgical History:   Procedure Laterality Date     LOW ANTERIOR BOWEL RESECTION         Health Maintenance:  Last  "Pap Smear: No history of abnormal pap smears        Physical Exam:   /88 (Cuff Size: Thigh)   Pulse 52   Temp 98.3  F (36.8  C) (Tympanic)   Resp 18   Ht 1.803 m (5' 11\")   Wt (!) 151.5 kg (334 lb)   SpO2 98%   BMI 46.58 kg/m    Body mass index is 46.58 kg/m .  GENERAL: Healthy, alert and no distress  EYES: Eyes grossly normal to inspection.  No discharge or erythema, or obvious scleral/conjunctival abnormalities.  RESP: No audible wheeze, cough, or visible cyanosis.  No visible retractions or increased work of breathing.    SKIN: Visible skin clear. No significant rash, abnormal pigmentation or lesions.  NEURO: Cranial nerves grossly intact.  Mentation and speech appropriate for age.  PSYCH: Mentation appears normal, affect normal/bright, judgement and insight intact, normal speech and appearance well-groomed.     Labs:  None      Assessment:    Sarah Portillo is a 57 year old woman with a diagnosis of recurrent rectal adenocarcinoma.     A total of 20 minutes was spent on patient care today.       Plan:     1.)    Recurrent rectal adenocarcinoma-pt is planned to undergo debulking procedure with Dr Gomez.  It is highly likely that this will require hysterectomy for adequate resection.  Given her age, I would also recommend removal of bilateral ovaries at the time of the procedure.  I will remain available to assist with the procedure if Dr Gomez deems it is appropriate to proceed.    2.) Pre-op teaching was completed today.        Thank you for allowing us to participate in the care of your patient.         Sincerely,    Bernadette Viveros MD  Gynecologic Oncology  [facility]       BARTOLOME SPICER        Again, thank you for allowing me to participate in the care of your patient.        Sincerely,        Donnie Viveros MD    "

## 2023-03-07 NOTE — NURSING NOTE
"Oncology Rooming Note    March 7, 2023 12:57 PM   Sarah Portillo is a 57 year old female who presents for:    Chief Complaint   Patient presents with     Oncology Clinic Visit     New patient-Malignant neoplasm of rectosigmoid junction       Initial Vitals: /88 (Cuff Size: Thigh)   Pulse 52   Temp 98.3  F (36.8  C) (Tympanic)   Resp 18   Ht 1.803 m (5' 11\")   Wt (!) 151.5 kg (334 lb)   SpO2 98%   BMI 46.58 kg/m   Estimated body mass index is 46.58 kg/m  as calculated from the following:    Height as of this encounter: 1.803 m (5' 11\").    Weight as of this encounter: 151.5 kg (334 lb). Body surface area is 2.75 meters squared.  No Pain (0) Comment: Data Unavailable   No LMP recorded. Patient is perimenopausal.  Allergies reviewed: Yes  Medications reviewed: Yes    Medications: Medication refills not needed today.  Pharmacy name entered into [facility]: [facility] [address] [facility] - [place], [place] - [address]    Clinical concerns: new patient       Bernadette Augustin CMA              "

## 2023-03-08 ENCOUNTER — LAB (OUTPATIENT)
Dept: LAB | Facility: CLINIC | Age: 58
End: 2023-03-08
Payer: COMMERCIAL

## 2023-03-08 ENCOUNTER — PRE VISIT (OUTPATIENT)
Dept: SURGERY | Facility: CLINIC | Age: 58
End: 2023-03-08

## 2023-03-08 ENCOUNTER — OFFICE VISIT (OUTPATIENT)
Dept: SURGERY | Facility: CLINIC | Age: 58
End: 2023-03-08
Payer: COMMERCIAL

## 2023-03-08 VITALS
TEMPERATURE: 97.8 F | DIASTOLIC BLOOD PRESSURE: 73 MMHG | BODY MASS INDEX: 41.02 KG/M2 | HEIGHT: 71 IN | WEIGHT: 293 LBS | SYSTOLIC BLOOD PRESSURE: 130 MMHG | OXYGEN SATURATION: 97 % | HEART RATE: 62 BPM

## 2023-03-08 DIAGNOSIS — C19 MALIGNANT NEOPLASM OF RECTOSIGMOID JUNCTION (H): ICD-10-CM

## 2023-03-08 DIAGNOSIS — C20 RECTAL ADENOCARCINOMA (H): ICD-10-CM

## 2023-03-08 DIAGNOSIS — C20 LOCAL RECURRENCE OF RECTAL CANCER (H): ICD-10-CM

## 2023-03-08 DIAGNOSIS — Z01.818 PREOP EXAMINATION: ICD-10-CM

## 2023-03-08 DIAGNOSIS — E66.01 MORBID OBESITY (H): ICD-10-CM

## 2023-03-08 DIAGNOSIS — Z01.818 PREOP EXAMINATION: Primary | ICD-10-CM

## 2023-03-08 DIAGNOSIS — D69.6 THROMBOCYTOPENIA, UNSPECIFIED (H): ICD-10-CM

## 2023-03-08 LAB
ALBUMIN SERPL BCG-MCNC: 4.2 G/DL (ref 3.5–5.2)
ALP SERPL-CCNC: 145 U/L (ref 35–104)
ALT SERPL W P-5'-P-CCNC: 26 U/L (ref 10–35)
ANION GAP SERPL CALCULATED.3IONS-SCNC: 9 MMOL/L (ref 7–15)
AST SERPL W P-5'-P-CCNC: 32 U/L (ref 10–35)
BASOPHILS # BLD AUTO: 0 10E3/UL (ref 0–0.2)
BASOPHILS NFR BLD AUTO: 1 %
BILIRUB SERPL-MCNC: 0.5 MG/DL
BUN SERPL-MCNC: 16.9 MG/DL (ref 6–20)
CALCIUM SERPL-MCNC: 9.4 MG/DL (ref 8.6–10)
CEA SERPL-MCNC: 4.8 NG/ML
CHLORIDE SERPL-SCNC: 105 MMOL/L (ref 98–107)
CREAT SERPL-MCNC: 0.84 MG/DL (ref 0.51–0.95)
DEPRECATED HCO3 PLAS-SCNC: 28 MMOL/L (ref 22–29)
EOSINOPHIL # BLD AUTO: 0.1 10E3/UL (ref 0–0.7)
EOSINOPHIL NFR BLD AUTO: 3 %
ERYTHROCYTE [DISTWIDTH] IN BLOOD BY AUTOMATED COUNT: 14 % (ref 10–15)
GFR SERPL CREATININE-BSD FRML MDRD: 81 ML/MIN/1.73M2
GLUCOSE SERPL-MCNC: 90 MG/DL (ref 70–99)
HCT VFR BLD AUTO: 40.3 % (ref 35–47)
HGB BLD-MCNC: 12.9 G/DL (ref 11.7–15.7)
IMM GRANULOCYTES # BLD: 0 10E3/UL
IMM GRANULOCYTES NFR BLD: 0 %
LYMPHOCYTES # BLD AUTO: 0.7 10E3/UL (ref 0.8–5.3)
LYMPHOCYTES NFR BLD AUTO: 22 %
MCH RBC QN AUTO: 27.3 PG (ref 26.5–33)
MCHC RBC AUTO-ENTMCNC: 32 G/DL (ref 31.5–36.5)
MCV RBC AUTO: 85 FL (ref 78–100)
MONOCYTES # BLD AUTO: 0.3 10E3/UL (ref 0–1.3)
MONOCYTES NFR BLD AUTO: 9 %
NEUTROPHILS # BLD AUTO: 2.2 10E3/UL (ref 1.6–8.3)
NEUTROPHILS NFR BLD AUTO: 65 %
NRBC # BLD AUTO: 0 10E3/UL
NRBC BLD AUTO-RTO: 0 /100
PLATELET # BLD AUTO: 146 10E3/UL (ref 150–450)
POTASSIUM SERPL-SCNC: 4.1 MMOL/L (ref 3.4–5.3)
PROT SERPL-MCNC: 7.1 G/DL (ref 6.4–8.3)
RBC # BLD AUTO: 4.72 10E6/UL (ref 3.8–5.2)
SODIUM SERPL-SCNC: 142 MMOL/L (ref 136–145)
WBC # BLD AUTO: 3.4 10E3/UL (ref 4–11)

## 2023-03-08 PROCEDURE — 86900 BLOOD TYPING SEROLOGIC ABO: CPT | Performed by: PATHOLOGY

## 2023-03-08 PROCEDURE — 84134 ASSAY OF PREALBUMIN: CPT | Performed by: PATHOLOGY

## 2023-03-08 PROCEDURE — 82378 CARCINOEMBRYONIC ANTIGEN: CPT | Performed by: PATHOLOGY

## 2023-03-08 PROCEDURE — 36415 COLL VENOUS BLD VENIPUNCTURE: CPT | Performed by: PATHOLOGY

## 2023-03-08 PROCEDURE — 99204 OFFICE O/P NEW MOD 45 MIN: CPT | Performed by: NURSE PRACTITIONER

## 2023-03-08 PROCEDURE — 86850 RBC ANTIBODY SCREEN: CPT | Performed by: PATHOLOGY

## 2023-03-08 PROCEDURE — 85025 COMPLETE CBC W/AUTO DIFF WBC: CPT | Performed by: PATHOLOGY

## 2023-03-08 PROCEDURE — 99000 SPECIMEN HANDLING OFFICE-LAB: CPT | Performed by: PATHOLOGY

## 2023-03-08 PROCEDURE — 86901 BLOOD TYPING SEROLOGIC RH(D): CPT | Performed by: PATHOLOGY

## 2023-03-08 PROCEDURE — 80053 COMPREHEN METABOLIC PANEL: CPT | Performed by: PATHOLOGY

## 2023-03-08 ASSESSMENT — PAIN SCALES - GENERAL: PAINLEVEL: NO PAIN (0)

## 2023-03-08 ASSESSMENT — ENCOUNTER SYMPTOMS
SEIZURES: 0
ORTHOPNEA: 0

## 2023-03-08 ASSESSMENT — LIFESTYLE VARIABLES: TOBACCO_USE: 0

## 2023-03-08 ASSESSMENT — COPD QUESTIONNAIRES: COPD: 0

## 2023-03-08 NOTE — H&P
Pre-Operative H & P     CC:  Preoperative exam to assess for increased cardiopulmonary risk while undergoing surgery and anesthesia.    Date of Encounter: 3/8/2023  Primary Care Physician:  No primary care provider on file.     Reason for visit:   Encounter Diagnoses   Name Primary?     Preop examination Yes     Malignant neoplasm of rectosigmoid junction (H)      Rectal adenocarcinoma (H)      Local recurrence of rectal cancer (H)        HPI  Sarah Portillo is a 57 year old female who presents for pre-operative H & P in preparation for  Procedure Information     Case: [id number] Date/Time: 03/09/23 0730    Procedures:       CYSTOSCOPY, WITH URETERAL STENT INSERTION (Bilateral: Urethra)      LAPAROTOMY, OPEN EXCISION OF PELVIC FLOOR RECURRENCE (Abdomen)      possible open removal of uterus, cervix, and both ovaries (Abdomen)    Anesthesia type: General with Block    Diagnosis:       Malignant neoplasm of rectosigmoid junction (H) [C19]      Rectal adenocarcinoma (H) [C20]      Local recurrence of rectal cancer (H) [C20]    Pre-op diagnosis:       Malignant neoplasm of rectosigmoid junction (H) [C19]      Rectal adenocarcinoma (H) [C20]      Local recurrence of rectal cancer (H) [C20]    [facility]  / [facility]    Providers: Catracho Connolly MD; Long Gomez MD; Bernadette Niño MD          Sarah Portillo is a 58 y/o female with a PMH significant for hypothyroidism, obesity, and a history of a pulmonary embolism who has a recurrent rectal adenocarcinoma. She was initially diagnosed in 10/2020 and underwent JULIA, chemo, radiation, and eventually a resection on 8/9/21 in [place].  During follow-up, she was diagnosed with a local recurrence in November 2022. CT guided biopsy demonstrated metastatic adenocaricnoma consistent with colorectal primary in December 2022. She had a MRI pelvis on 1/10/2023 which revealed a right posterior pelvis sidewall/pelvic floor recurrence with tethering of  the cervix. PET scan on 1/13/2023 demonstrated a right posterolateral pelvic sidewall soft tissue mass. She has consulted with Dr. Gomez on 2/13/23 and the above surgery has been recommneded for further managment.      History is obtained from the patient and chart review    Hx of abnormal bleeding or anti-platelet use: None.    Menstrual history: No LMP recorded. Patient is perimenopausal.:      Past Medical History  Past Medical History:   Diagnosis Date     Colon cancer (H)      History of pulmonary embolism 12/15/2020    Provoked by chemo and immobility. Was on warfarin x 1 year.     Hypothyroidism      Obesity        Past Surgical History  Past Surgical History:   Procedure Laterality Date     LOW ANTERIOR BOWEL RESECTION  08/09/2021    In [place]     SALPINGECTOMY      1998 d/t tubal pregnancy       Prior to Admission Medications  Current Outpatient Medications   Medication Sig Dispense Refill     levothyroxine (SYNTHROID/LEVOTHROID) 150 MCG tablet Take 1 tablet by mouth every morning         Allergies  No Known Allergies    Social History  Social History     Socioeconomic History     Marital status:      Spouse name: Not on file     Number of children: Not on file     Years of education: Not on file     Highest education level: Not on file   Occupational History     Not on file   Tobacco Use     Smoking status: Never     Smokeless tobacco: Never   Substance and Sexual Activity     Alcohol use: Never     Drug use: Never     Sexual activity: Not on file   Other Topics Concern     Not on file   Social History Narrative     Not on file     Social Determinants of Health     Financial Resource Strain: Not on file   Food Insecurity: Not on file   Transportation Needs: Not on file   Physical Activity: Not on file   Stress: Not on file   Social Connections: Not on file   Intimate Partner Violence: Not on file   Housing Stability: Not on file       Family History  Family History   Problem Relation Age of Onset  "    Venous thrombosis Nephew      Anesthesia Reaction No family hx of      Bleeding Disorder No family hx of        Review of Systems  The complete review of systems is negative other than noted in the HPI or here.   Anesthesia Evaluation   Pt has had prior anesthetic. Type: General.    No history of anesthetic complications       ROS/MED HX  ENT/Pulmonary:    (-) tobacco use, asthma, COPD and sleep apnea   Neurologic:    (-) no seizures, no CVA and migraines   Cardiovascular:     (+) -----Previous cardiac testing   Echo: Date: Results:    Stress Test: Date: Results:    ECG Reviewed: Date: 2020 Results:  Completed before surgery in 2020 in [place]. Denies any abnormality.   Cath: Date: Results:   (-) hypertension, CAD, CHF, SANDRA, orthopnea/PND, irregular heartbeat/palpitations, valvular problems/murmurs and dyslipidemia   METS/Exercise Tolerance:  Comment: Walking 20 mins daily and climbs 4 flights of stairs in home without and exertional symptoms   Hematologic: Comments: History of provoked DVT/PE (12/15/20) in [place]    (+) History of blood clots, pt is not anticoagulated, history of blood transfusion, no previous transfusion reaction, Known PRBC Anitbodies:No  (-) anemia   Musculoskeletal:  - neg musculoskeletal ROS     GI/Hepatic: Comment: Colon CA/recurrent rectal adenocarcinoma   (-) GERD and hepatitis   Renal/Genitourinary:    (-) renal disease and nephrolithiasis   Endo:     (+) thyroid problem, hypothyroidism, Obesity,  (-) Type II DM and chronic steroid usage   Psychiatric/Substance Use:  - neg psychiatric ROS     Infectious Disease:    (-) Recent Fever, MRSA, VRE, HIV and TB   Malignancy:   (+) Malignancy, History of Other.Other CA Recurrence of colon cancer  Active status post Radiation, Chemo and Surgery.    Other:            /73 (BP Location: Right arm, Patient Position: Sitting, Cuff Size: Adult Large)   Pulse 62   Temp 97.8  F (36.6  C) (Oral)   Ht 1.803 m (5' 11\")   Wt (!) 153.3 kg (338 lb)   " SpO2 97%   Breastfeeding No   BMI 47.14 kg/m      Physical Exam   Constitutional: Awake, alert, cooperative, no apparent distress, and appears stated age.  Eyes: Pupils equal, round and reactive to light, extra ocular muscles intact, sclera clear, conjunctiva normal.  HENT: Normocephalic, oral pharynx with moist mucus membranes, good dentition. No goiter appreciated.   Respiratory: Clear to auscultation bilaterally, no crackles or wheezing.  Cardiovascular: Regular rate and rhythm, normal S1 and S2, and no murmur noted.  Carotids +2, no bruits. No edema. Palpable pulses to radial and PT arteries.   GI: Normal bowel sounds, soft, non-distended, non-tender, no masses palpated, no hepatosplenomegaly.    Lymph/Hematologic: No cervical lymphadenopathy and no supraclavicular lymphadenopathy.  Genitourinary:  Deferred.  Skin: Warm and dry.   Musculoskeletal: Full ROM of neck. There is no redness, warmth, or swelling of the joints. Gross motor strength is normal.    Neurologic: Awake, alert, oriented to name, place and time. Cranial nerves II-XII are grossly intact. Gait is normal.   Neuropsychiatric: Calm, cooperative. Normal affect.     Prior Labs/Diagnostic Studies   All labs and imaging personally reviewed     EKG/ stress test - if available please see in ROS above     The patient's records and results personally reviewed by this provider.     Outside records reviewed from: Care Everywhere    LAB/DIAGNOSTIC STUDIES TODAY:      Component      Latest Ref Rng & Units 3/8/2023   WBC      4.0 - 11.0 10e3/uL 3.4 (L)   RBC Count      3.80 - 5.20 10e6/uL 4.72   Hemoglobin      11.7 - 15.7 g/dL 12.9   Hematocrit      35.0 - 47.0 % 40.3   MCV      78 - 100 fL 85   MCH      26.5 - 33.0 pg 27.3   MCHC      31.5 - 36.5 g/dL 32.0   RDW      10.0 - 15.0 % 14.0   Platelet Count      150 - 450 10e3/uL 146 (L)   % Neutrophils      % 65   % Lymphocytes      % 22   % Monocytes      % 9   % Eosinophils      % 3   % Basophils      % 1   %  Immature Granulocytes      % 0   NRBCs per 100 WBC      <1 /100 0   Absolute Neutrophils      1.6 - 8.3 10e3/uL 2.2   Absolute Lymphocytes      0.8 - 5.3 10e3/uL 0.7 (L)   Absolute Monocytes      0.0 - 1.3 10e3/uL 0.3   Absolute Eosinophils      0.0 - 0.7 10e3/uL 0.1   Absolute Basophils      0.0 - 0.2 10e3/uL 0.0   Absolute Immature Granulocytes      <=0.4 10e3/uL 0.0   Absolute NRBCs      10e3/uL 0.0   Sodium      136 - 145 mmol/L 142   Potassium      3.4 - 5.3 mmol/L 4.1   Chloride      98 - 107 mmol/L 105   Carbon Dioxide (CO2)      22 - 29 mmol/L 28   Anion Gap      7 - 15 mmol/L 9   Urea Nitrogen      6.0 - 20.0 mg/dL 16.9   Creatinine      0.51 - 0.95 mg/dL 0.84   Calcium      8.6 - 10.0 mg/dL 9.4   Glucose      70 - 99 mg/dL 90   Alkaline Phosphatase      35 - 104 U/L 145 (H)   AST      10 - 35 U/L 32   ALT      10 - 35 U/L 26   Protein Total      6.4 - 8.3 g/dL 7.1   Albumin      3.5 - 5.2 g/dL 4.2   Bilirubin Total      <=1.2 mg/dL 0.5   GFR Estimate      >60 mL/min/1.73m2 81       Assessment      Sarah Portillo is a 57 year old female seen as a PAC referral for risk assessment and optimization for anesthesia.    Plan/Recommendations  Pt will be optimized for the proposed procedure.  See below for details on the assessment, risk, and preoperative recommendations    NEUROLOGY  - No history of TIA, CVA or seizure  -Post Op delirium risk factors:  No risk identified    ENT  - No current airway concerns.  Will need to be reassessed day of surgery.  Mallampati: I  TM: > 3    CARDIAC  - No history of CAD, Hypertension and Afib  - METS (Metabolic Equivalents)  Patient performs 4 or more METS exercise without symptoms            Total Score: 0      RCRI-Very low risk: Class 1 0.4% complication rate            Total Score: 0        PULMONARY  NEW Low Risk            Total Score: 2    NEW: BMI over 35 kg/m2    NEW: Over 50 ys old      - Denies asthma or inhaler use  - Tobacco History      History   Smoking Status  "    Never   Smokeless Tobacco     Never       GI  - Rectal adenocarcinoma. Initially diagnosed 10/2020, underwent chemo, radiation, ad a resection on 8/9/21 in [place]. Recurrence diagnosed on 11/2022. Surgery planned as above.   PONV High Risk  Total Score: 3           1 AN PONV: Pt is Female    1 AN PONV: Patient is not a current smoker    1 AN PONV: Intended Post Op Opioids        /RENAL  - Baseline Creatinine 0.87    ENDOCRINE    - BMI: Estimated body mass index is 47.14 kg/m  as calculated from the following:    Height as of this encounter: 1.803 m (5' 11\").    Weight as of this encounter: 153.3 kg (338 lb).  Class 3 Obesity (BMI > 40)  - No history of Diabetes Mellitus  - Thyroid disorder  Take levothyroxine with a sip of water on the morning of surgery. Continue home replacement while hospitalized.    HEME  VTE High Risk 3%            Total Score: 9    VTE: BMI greater than 39    VTE: Pt history of VTE    VTE: Current cancer      - No history of abnormal bleeding or antiplatelet use.   -History of provoked DVT/PE (12/15/20) in [place]. It was felt it was due to chemo and a long 5 hour car ride. Was on warfain x 1 year and had IVC filter placed (now removed). Not currently anticoagulated. A bilateral LE ultrasound to assess for DVTs ordered by Dr. Gomez and completed in [place] on 2/17/23. It was negative for DVTs.     Different anesthesia methods/types have been discussed with the patient, but they are aware that the final plan will be decided by the assigned anesthesia provider on the date of service.      The patient is optimized for their procedure. AVS with information on surgery time/arrival time, meds and NPO status given by nursing staff. No further diagnostic testing indicated.      On the day of service:     Prep time: 20 minutes  Visit time: 15 minutes  Documentation time: 15 minutes  ------------------------------------------  Total time: 50 minutes      DIDI Lara CNP (supervised by Cassie " DIDI Chery CNP)  Preoperative Assessment Center  [facility]  Phone: [phone]  Fax: [phone]

## 2023-03-08 NOTE — PATIENT INSTRUCTIONS
Preparing for Your Surgery      Name:  Sarah Portillo   MRN:  [id number]   :  1965   Today's Date:  3/8/2023       Arriving for surgery:  Surgery date:  3/9/23  Arrival time:  05:30 AM     Surgeries and procedures: Adult patients can have 2 visitors all through the surgery process.     Visiting hours: 8 a.m. to 8:30 p.m.     Hospital: Adult patients and children under age 18 can have 4 visitor at a time     No visitors under the age of 5 are allowed for hospital patients.  Double occupancy rooms: Patients can have only two visitors at a time.     Patients with disabilities: Can have a support person with them (family member, service provider     Or someone well informed about their needs) plus the allowed number of visitors     Patients confirmed or suspected to have symptoms of COVID 19 or flu:     No visitors allowed for adult patients.   Children (under age 18) can have 1 named visitor.     People who are sick or showing symptoms of COVID 19 or flu:    Are not allowed to visit patients--we can only make exceptions in special situations.       Please follow these guidelines for your visit:   Arrive wearing a mask over your mouth and nose; we will give you a medical mask to wear    If you arrive wearing a cloth mask.   Keep it on during your entire visit, even when in patient's room.   If you don't wear a mask we'll ask you to leave.     Clean your hands with alcohol hand . Do this when you arrive at and leave the building and patient room,    And again after you touch your mask or anything in the room.     You can t visit if you have a fever, cough, shortness of breath, muscle aches, headaches, sore throat    Or diarrhea      Stay 6 feet away from others during your visit and between visits     Go directly to and from the room you are visiting.     Stay in the patient s room during your visit. Limit going to other places in the hospital as much as possible     Leave bags and jackets at home or in  the car.     For everyone s health, please don t come and go during your visit. That includes for smoking   during your visit.     Please come to:     [facility] [address]  [address] [place]  [address]    - ? parking is available in front of the hospital      -    Please proceed to [address] on the 3rd floor. [phone]?     - ?If you are in need of directions, wheelchair or escort please stop at the Information Desk in the lobby.  Inform the information person that you are here for surgery; a wheelchair and escort to [address] will be provided.?     What can I eat or drink?  -  You may eat and drink normally up to 8 hours prior to arrival time. (Until 09:30 PM the evening prior to your surgery)  -  You may have clear liquids until 2 hours prior to arrival time. (Until 03:30 AM)    Examples of clear liquids:  Water  Clear broth  Juices (apple, white grape, white cranberry  and cider) without pulp  Noncarbonated, powder based beverages  (lemonade and Jaya-Aid)  Sodas (Sprite, 7-Up, ginger ale and seltzer)  Coffee or tea (without milk or cream)  Gatorade    -  No Alcohol for at least 24 hours before surgery.     Which medicines can I take?    Hold Aspirin for 7 days before surgery.   Hold Multivitamins for 7 days before surgery.  Hold Supplements for 7 days before surgery.  Hold Ibuprofen (Advil, Motrin) for 1 day(s) before surgery--unless otherwise directed by surgeon.  Hold Naproxen (Aleve) for 4 days before surgery    -  PLEASE TAKE these medications the day of surgery:  Levothyroxine (Synthroid)    How do I prepare myself?  - Please take 2 showers (one the night prior to surgery and one the morning of surgery) using Scrubcare or Hibiclens soap.    Use this soap only from the neck to your toes.     Leave the soap on your skin for one minute--then rinse thoroughly.      You may use your own shampoo and conditioner. No other hair products.   -  Please remove all jewelry and body piercings.  - No lotions, deodorants or fragrance.  - No makeup or fingernail polish.   - Bring your ID and insurance card.    -If you have a Deep Brain Stimulator, Spinal Cord Stimulator, or any Neuro Stimulator device---you must bring the remote control to the hospital.      ALL PATIENTS GOING HOME THE SAME DAY OF SURGERY ARE REQUIRED TO HAVE A RESPONSIBLE ADULT TO DRIVE AND BE IN ATTENDANCE WITH THEM FOR 24 HOURS FOLLOWING SURGERY.    Covid testing policy as of 12/06/2022  Your surgeon will notify and schedule you for a COVID test if one is needed before surgery--please direct any questions or COVID symptoms to your surgeon      Questions or Concerns:    - For any questions regarding the day of surgery or your hospital stay, please contact the Pre Admission Nursing Office at [phone].       - If you have health changes between today and your surgery, please call your surgeon.       - For questions after surgery, please call your surgeons office.

## 2023-03-09 ENCOUNTER — HOSPITAL ENCOUNTER (INPATIENT)
Facility: CLINIC | Age: 58
LOS: 5 days | Discharge: HOME | End: 2023-03-14
Attending: UROLOGY | Admitting: COLON & RECTAL SURGERY
Payer: COMMERCIAL

## 2023-03-09 ENCOUNTER — ANESTHESIA (OUTPATIENT)
Dept: SURGERY | Facility: CLINIC | Age: 58
End: 2023-03-09
Payer: COMMERCIAL

## 2023-03-09 DIAGNOSIS — C20 LOCAL RECURRENCE OF RECTAL CANCER (H): Primary | ICD-10-CM

## 2023-03-09 LAB
PREALB SERPL IA-MCNC: 23 MG/DL (ref 15–45)
SARS-COV-2 RNA RESP QL NAA+PROBE: NEGATIVE

## 2023-03-09 PROCEDURE — 49203 PR EXCISION/DESTRUCTION OPEN ABDOMINAL TUMORS 5 CM: CPT | Performed by: COLON & RECTAL SURGERY

## 2023-03-09 PROCEDURE — 250N000011 HC RX IP 250 OP 636: Performed by: NURSE ANESTHETIST, CERTIFIED REGISTERED

## 2023-03-09 PROCEDURE — 250N000009 HC RX 250: Performed by: NURSE ANESTHETIST, CERTIFIED REGISTERED

## 2023-03-09 PROCEDURE — C1765 ADHESION BARRIER: HCPCS | Performed by: UROLOGY

## 2023-03-09 PROCEDURE — 07TC0ZZ RESECTION OF PELVIS LYMPHATIC, OPEN APPROACH: ICD-10-PCS | Performed by: OBSTETRICS & GYNECOLOGY

## 2023-03-09 PROCEDURE — 710N000010 HC RECOVERY PHASE 1, LEVEL 2, PER MIN: Performed by: UROLOGY

## 2023-03-09 PROCEDURE — 88305 TISSUE EXAM BY PATHOLOGIST: CPT | Mod: TC | Performed by: COLON & RECTAL SURGERY

## 2023-03-09 PROCEDURE — 88305 TISSUE EXAM BY PATHOLOGIST: CPT | Mod: 26 | Performed by: PATHOLOGY

## 2023-03-09 PROCEDURE — 88307 TISSUE EXAM BY PATHOLOGIST: CPT | Mod: 26 | Performed by: PATHOLOGY

## 2023-03-09 PROCEDURE — 250N000011 HC RX IP 250 OP 636: Performed by: ANESTHESIOLOGY

## 2023-03-09 PROCEDURE — 88331 PATH CONSLTJ SURG 1 BLK 1SPC: CPT | Mod: TC | Performed by: COLON & RECTAL SURGERY

## 2023-03-09 PROCEDURE — 0UT20ZZ RESECTION OF BILATERAL OVARIES, OPEN APPROACH: ICD-10-PCS | Performed by: OBSTETRICS & GYNECOLOGY

## 2023-03-09 PROCEDURE — 370N000017 HC ANESTHESIA TECHNICAL FEE, PER MIN: Performed by: UROLOGY

## 2023-03-09 PROCEDURE — 250N000011 HC RX IP 250 OP 636: Performed by: COLON & RECTAL SURGERY

## 2023-03-09 PROCEDURE — 360N000084 HC SURGERY LEVEL 4 W/ FLUORO, PER MIN: Performed by: UROLOGY

## 2023-03-09 PROCEDURE — 999N000141 HC STATISTIC PRE-PROCEDURE NURSING ASSESSMENT: Performed by: UROLOGY

## 2023-03-09 PROCEDURE — C9290 INJ, BUPIVACAINE LIPOSOME: HCPCS | Performed by: ANESTHESIOLOGY

## 2023-03-09 PROCEDURE — 272N000001 HC OR GENERAL SUPPLY STERILE: Performed by: UROLOGY

## 2023-03-09 PROCEDURE — 0UT70ZZ RESECTION OF BILATERAL FALLOPIAN TUBES, OPEN APPROACH: ICD-10-PCS | Performed by: OBSTETRICS & GYNECOLOGY

## 2023-03-09 PROCEDURE — 258N000003 HC RX IP 258 OP 636: Performed by: NURSE ANESTHETIST, CERTIFIED REGISTERED

## 2023-03-09 PROCEDURE — 258N000003 HC RX IP 258 OP 636: Performed by: COLON & RECTAL SURGERY

## 2023-03-09 PROCEDURE — 49905 OMENTAL FLAP INTRA-ABDOM: CPT | Performed by: COLON & RECTAL SURGERY

## 2023-03-09 PROCEDURE — 52005 CYSTO W/URTRL CATHJ: CPT | Performed by: UROLOGY

## 2023-03-09 PROCEDURE — 250N000013 HC RX MED GY IP 250 OP 250 PS 637: Performed by: COLON & RECTAL SURGERY

## 2023-03-09 PROCEDURE — C1758 CATHETER, URETERAL: HCPCS | Performed by: UROLOGY

## 2023-03-09 PROCEDURE — 88331 PATH CONSLTJ SURG 1 BLK 1SPC: CPT | Mod: 26 | Performed by: PATHOLOGY

## 2023-03-09 PROCEDURE — 0JXC0ZB TRANSFER PELVIC REGION SUBCUTANEOUS TISSUE AND FASCIA WITH SKIN AND SUBCUTANEOUS TISSUE, OPEN APPROACH: ICD-10-PCS | Performed by: OBSTETRICS & GYNECOLOGY

## 2023-03-09 PROCEDURE — 0UT90ZZ RESECTION OF UTERUS, OPEN APPROACH: ICD-10-PCS | Performed by: OBSTETRICS & GYNECOLOGY

## 2023-03-09 PROCEDURE — 120N000002 HC R&B MED SURG/OB UMMC

## 2023-03-09 PROCEDURE — C1769 GUIDE WIRE: HCPCS | Performed by: UROLOGY

## 2023-03-09 PROCEDURE — 0T788DZ DILATION OF BILATERAL URETERS WITH INTRALUMINAL DEVICE, VIA NATURAL OR ARTIFICIAL OPENING ENDOSCOPIC: ICD-10-PCS | Performed by: OBSTETRICS & GYNECOLOGY

## 2023-03-09 PROCEDURE — 250N000025 HC SEVOFLURANE, PER MIN: Performed by: UROLOGY

## 2023-03-09 PROCEDURE — U0003 INFECTIOUS AGENT DETECTION BY NUCLEIC ACID (DNA OR RNA); SEVERE ACUTE RESPIRATORY SYNDROME CORONAVIRUS 2 (SARS-COV-2) (CORONAVIRUS DISEASE [COVID-19]), AMPLIFIED PROBE TECHNIQUE, MAKING USE OF HIGH THROUGHPUT TECHNOLOGIES AS DESCRIBED BY CMS-2020-01-R: HCPCS | Performed by: UROLOGY

## 2023-03-09 RX ORDER — ENOXAPARIN SODIUM 100 MG/ML
40 INJECTION SUBCUTANEOUS
Status: COMPLETED | OUTPATIENT
Start: 2023-03-09 | End: 2023-03-09

## 2023-03-09 RX ORDER — ALBUTEROL SULFATE 0.83 MG/ML
2.5 SOLUTION RESPIRATORY (INHALATION) EVERY 4 HOURS PRN
Status: DISCONTINUED | OUTPATIENT
Start: 2023-03-09 | End: 2023-03-09 | Stop reason: HOSPADM

## 2023-03-09 RX ORDER — BUPIVACAINE HYDROCHLORIDE 2.5 MG/ML
INJECTION, SOLUTION EPIDURAL; INFILTRATION; INTRACAUDAL
Status: COMPLETED | OUTPATIENT
Start: 2023-03-09 | End: 2023-03-09

## 2023-03-09 RX ORDER — NALOXONE HYDROCHLORIDE 0.4 MG/ML
0.2 INJECTION, SOLUTION INTRAMUSCULAR; INTRAVENOUS; SUBCUTANEOUS
Status: DISCONTINUED | OUTPATIENT
Start: 2023-03-09 | End: 2023-03-09 | Stop reason: HOSPADM

## 2023-03-09 RX ORDER — KETAMINE HYDROCHLORIDE 10 MG/ML
INJECTION INTRAMUSCULAR; INTRAVENOUS PRN
Status: DISCONTINUED | OUTPATIENT
Start: 2023-03-09 | End: 2023-03-09

## 2023-03-09 RX ORDER — DIPHENHYDRAMINE HYDROCHLORIDE 50 MG/ML
25 INJECTION INTRAMUSCULAR; INTRAVENOUS EVERY 6 HOURS PRN
Status: DISCONTINUED | OUTPATIENT
Start: 2023-03-09 | End: 2023-03-14 | Stop reason: HOSPADM

## 2023-03-09 RX ORDER — SODIUM CHLORIDE, SODIUM LACTATE, POTASSIUM CHLORIDE, CALCIUM CHLORIDE 600; 310; 30; 20 MG/100ML; MG/100ML; MG/100ML; MG/100ML
INJECTION, SOLUTION INTRAVENOUS CONTINUOUS PRN
Status: DISCONTINUED | OUTPATIENT
Start: 2023-03-09 | End: 2023-03-09

## 2023-03-09 RX ORDER — CELECOXIB 200 MG/1
200 CAPSULE ORAL ONCE
Status: COMPLETED | OUTPATIENT
Start: 2023-03-09 | End: 2023-03-09

## 2023-03-09 RX ORDER — FENTANYL CITRATE 50 UG/ML
50 INJECTION, SOLUTION INTRAMUSCULAR; INTRAVENOUS EVERY 5 MIN PRN
Status: DISCONTINUED | OUTPATIENT
Start: 2023-03-09 | End: 2023-03-09 | Stop reason: HOSPADM

## 2023-03-09 RX ORDER — LORAZEPAM 2 MG/ML
0.5 INJECTION INTRAMUSCULAR EVERY 4 HOURS PRN
Status: DISCONTINUED | OUTPATIENT
Start: 2023-03-09 | End: 2023-03-14 | Stop reason: HOSPADM

## 2023-03-09 RX ORDER — ENOXAPARIN SODIUM 100 MG/ML
40 INJECTION SUBCUTANEOUS EVERY 12 HOURS
Status: DISCONTINUED | OUTPATIENT
Start: 2023-03-10 | End: 2023-03-11

## 2023-03-09 RX ORDER — GRANISETRON HYDROCHLORIDE 1 MG/ML
1 INJECTION INTRAVENOUS ONCE
Status: COMPLETED | OUTPATIENT
Start: 2023-03-09 | End: 2023-03-09

## 2023-03-09 RX ORDER — NALOXONE HYDROCHLORIDE 0.4 MG/ML
0.4 INJECTION, SOLUTION INTRAMUSCULAR; INTRAVENOUS; SUBCUTANEOUS
Status: DISCONTINUED | OUTPATIENT
Start: 2023-03-09 | End: 2023-03-14 | Stop reason: HOSPADM

## 2023-03-09 RX ORDER — ONDANSETRON 2 MG/ML
4 INJECTION INTRAMUSCULAR; INTRAVENOUS EVERY 30 MIN PRN
Status: COMPLETED | OUTPATIENT
Start: 2023-03-09 | End: 2023-03-09

## 2023-03-09 RX ORDER — SODIUM CHLORIDE, SODIUM LACTATE, POTASSIUM CHLORIDE, CALCIUM CHLORIDE 600; 310; 30; 20 MG/100ML; MG/100ML; MG/100ML; MG/100ML
INJECTION, SOLUTION INTRAVENOUS CONTINUOUS
Status: DISCONTINUED | OUTPATIENT
Start: 2023-03-09 | End: 2023-03-13

## 2023-03-09 RX ORDER — DEXAMETHASONE SODIUM PHOSPHATE 4 MG/ML
INJECTION, SOLUTION INTRA-ARTICULAR; INTRALESIONAL; INTRAMUSCULAR; INTRAVENOUS; SOFT TISSUE PRN
Status: DISCONTINUED | OUTPATIENT
Start: 2023-03-09 | End: 2023-03-09

## 2023-03-09 RX ORDER — FLUMAZENIL 0.1 MG/ML
0.2 INJECTION, SOLUTION INTRAVENOUS
Status: DISCONTINUED | OUTPATIENT
Start: 2023-03-09 | End: 2023-03-09 | Stop reason: HOSPADM

## 2023-03-09 RX ORDER — NALOXONE HYDROCHLORIDE 0.4 MG/ML
0.2 INJECTION, SOLUTION INTRAMUSCULAR; INTRAVENOUS; SUBCUTANEOUS
Status: DISCONTINUED | OUTPATIENT
Start: 2023-03-09 | End: 2023-03-14 | Stop reason: HOSPADM

## 2023-03-09 RX ORDER — PROPOFOL 10 MG/ML
INJECTION, EMULSION INTRAVENOUS PRN
Status: DISCONTINUED | OUTPATIENT
Start: 2023-03-09 | End: 2023-03-09

## 2023-03-09 RX ORDER — MEPERIDINE HYDROCHLORIDE 25 MG/ML
12.5 INJECTION INTRAMUSCULAR; INTRAVENOUS; SUBCUTANEOUS EVERY 5 MIN PRN
Status: DISCONTINUED | OUTPATIENT
Start: 2023-03-09 | End: 2023-03-09 | Stop reason: HOSPADM

## 2023-03-09 RX ORDER — FENTANYL CITRATE 50 UG/ML
25 INJECTION, SOLUTION INTRAMUSCULAR; INTRAVENOUS EVERY 5 MIN PRN
Status: DISCONTINUED | OUTPATIENT
Start: 2023-03-09 | End: 2023-03-09 | Stop reason: HOSPADM

## 2023-03-09 RX ORDER — LEVOTHYROXINE SODIUM 150 UG/1
150 TABLET ORAL EVERY MORNING
Status: DISCONTINUED | OUTPATIENT
Start: 2023-03-10 | End: 2023-03-14 | Stop reason: HOSPADM

## 2023-03-09 RX ORDER — LIDOCAINE HYDROCHLORIDE 20 MG/ML
INJECTION, SOLUTION INFILTRATION; PERINEURAL PRN
Status: DISCONTINUED | OUTPATIENT
Start: 2023-03-09 | End: 2023-03-09

## 2023-03-09 RX ORDER — SODIUM CHLORIDE, SODIUM LACTATE, POTASSIUM CHLORIDE, CALCIUM CHLORIDE 600; 310; 30; 20 MG/100ML; MG/100ML; MG/100ML; MG/100ML
INJECTION, SOLUTION INTRAVENOUS CONTINUOUS
Status: DISCONTINUED | OUTPATIENT
Start: 2023-03-09 | End: 2023-03-09 | Stop reason: HOSPADM

## 2023-03-09 RX ORDER — NALOXONE HYDROCHLORIDE 0.4 MG/ML
0.4 INJECTION, SOLUTION INTRAMUSCULAR; INTRAVENOUS; SUBCUTANEOUS
Status: DISCONTINUED | OUTPATIENT
Start: 2023-03-09 | End: 2023-03-09 | Stop reason: HOSPADM

## 2023-03-09 RX ORDER — ONDANSETRON 4 MG/1
4 TABLET, ORALLY DISINTEGRATING ORAL EVERY 30 MIN PRN
Status: COMPLETED | OUTPATIENT
Start: 2023-03-09 | End: 2023-03-09

## 2023-03-09 RX ORDER — HYDRALAZINE HYDROCHLORIDE 20 MG/ML
10 INJECTION INTRAMUSCULAR; INTRAVENOUS EVERY 4 HOURS PRN
Status: DISCONTINUED | OUTPATIENT
Start: 2023-03-09 | End: 2023-03-14 | Stop reason: HOSPADM

## 2023-03-09 RX ORDER — HYDROMORPHONE HCL IN WATER/PF 6 MG/30 ML
0.2 PATIENT CONTROLLED ANALGESIA SYRINGE INTRAVENOUS EVERY 5 MIN PRN
Status: DISCONTINUED | OUTPATIENT
Start: 2023-03-09 | End: 2023-03-09 | Stop reason: HOSPADM

## 2023-03-09 RX ORDER — ERTAPENEM 1 G/1
1 INJECTION, POWDER, LYOPHILIZED, FOR SOLUTION INTRAMUSCULAR; INTRAVENOUS
Status: COMPLETED | OUTPATIENT
Start: 2023-03-09 | End: 2023-03-09

## 2023-03-09 RX ORDER — METHOCARBAMOL 500 MG/1
500 TABLET, FILM COATED ORAL 3 TIMES DAILY PRN
Status: DISCONTINUED | OUTPATIENT
Start: 2023-03-09 | End: 2023-03-14 | Stop reason: HOSPADM

## 2023-03-09 RX ORDER — FENTANYL CITRATE 50 UG/ML
25-50 INJECTION, SOLUTION INTRAMUSCULAR; INTRAVENOUS
Status: DISCONTINUED | OUTPATIENT
Start: 2023-03-09 | End: 2023-03-09 | Stop reason: HOSPADM

## 2023-03-09 RX ORDER — HYDROMORPHONE HCL IN WATER/PF 6 MG/30 ML
0.4 PATIENT CONTROLLED ANALGESIA SYRINGE INTRAVENOUS EVERY 5 MIN PRN
Status: DISCONTINUED | OUTPATIENT
Start: 2023-03-09 | End: 2023-03-09 | Stop reason: HOSPADM

## 2023-03-09 RX ORDER — SODIUM CHLORIDE, SODIUM GLUCONATE, SODIUM ACETATE, POTASSIUM CHLORIDE AND MAGNESIUM CHLORIDE 526; 502; 368; 37; 30 MG/100ML; MG/100ML; MG/100ML; MG/100ML; MG/100ML
INJECTION, SOLUTION INTRAVENOUS CONTINUOUS PRN
Status: DISCONTINUED | OUTPATIENT
Start: 2023-03-09 | End: 2023-03-09

## 2023-03-09 RX ORDER — ERTAPENEM 1 G/1
1 INJECTION, POWDER, LYOPHILIZED, FOR SOLUTION INTRAMUSCULAR; INTRAVENOUS ONCE
Status: COMPLETED | OUTPATIENT
Start: 2023-03-10 | End: 2023-03-10

## 2023-03-09 RX ORDER — ACETAMINOPHEN 500 MG
1000 TABLET ORAL 4 TIMES DAILY
Status: DISCONTINUED | OUTPATIENT
Start: 2023-03-09 | End: 2023-03-14 | Stop reason: HOSPADM

## 2023-03-09 RX ORDER — KETOROLAC TROMETHAMINE 15 MG/ML
15 INJECTION, SOLUTION INTRAMUSCULAR; INTRAVENOUS EVERY 6 HOURS
Status: DISCONTINUED | OUTPATIENT
Start: 2023-03-09 | End: 2023-03-11

## 2023-03-09 RX ORDER — LIDOCAINE 40 MG/G
CREAM TOPICAL
Status: DISCONTINUED | OUTPATIENT
Start: 2023-03-09 | End: 2023-03-14 | Stop reason: HOSPADM

## 2023-03-09 RX ORDER — FENTANYL CITRATE 50 UG/ML
INJECTION, SOLUTION INTRAMUSCULAR; INTRAVENOUS PRN
Status: DISCONTINUED | OUTPATIENT
Start: 2023-03-09 | End: 2023-03-09

## 2023-03-09 RX ADMIN — Medication: at 14:36

## 2023-03-09 RX ADMIN — ACETAMINOPHEN 1000 MG: 500 TABLET ORAL at 20:47

## 2023-03-09 RX ADMIN — Medication 65 MG: at 07:58

## 2023-03-09 RX ADMIN — ENOXAPARIN SODIUM 40 MG: 40 INJECTION SUBCUTANEOUS at 07:00

## 2023-03-09 RX ADMIN — CELECOXIB 200 MG: 200 CAPSULE ORAL at 06:47

## 2023-03-09 RX ADMIN — HYDROMORPHONE HYDROCHLORIDE 0.4 MG: 0.2 INJECTION, SOLUTION INTRAMUSCULAR; INTRAVENOUS; SUBCUTANEOUS at 14:12

## 2023-03-09 RX ADMIN — SUGAMMADEX 300 MG: 100 INJECTION, SOLUTION INTRAVENOUS at 13:18

## 2023-03-09 RX ADMIN — SODIUM CHLORIDE, POTASSIUM CHLORIDE, SODIUM LACTATE AND CALCIUM CHLORIDE: 600; 310; 30; 20 INJECTION, SOLUTION INTRAVENOUS at 21:45

## 2023-03-09 RX ADMIN — PROPOFOL 170 MG: 10 INJECTION, EMULSION INTRAVENOUS at 07:57

## 2023-03-09 RX ADMIN — BUPIVACAINE HYDROCHLORIDE 20 ML: 2.5 INJECTION, SOLUTION EPIDURAL; INFILTRATION; INTRACAUDAL; PERINEURAL at 07:50

## 2023-03-09 RX ADMIN — Medication 20 MG: at 10:00

## 2023-03-09 RX ADMIN — ONDANSETRON 4 MG: 2 INJECTION INTRAMUSCULAR; INTRAVENOUS at 14:30

## 2023-03-09 RX ADMIN — ONDANSETRON 4 MG: 2 INJECTION INTRAMUSCULAR; INTRAVENOUS at 14:10

## 2023-03-09 RX ADMIN — Medication 10 MG: at 09:42

## 2023-03-09 RX ADMIN — FENTANYL CITRATE 100 MCG: 50 INJECTION, SOLUTION INTRAMUSCULAR; INTRAVENOUS at 07:55

## 2023-03-09 RX ADMIN — FENTANYL CITRATE 50 MCG: 50 INJECTION, SOLUTION INTRAMUSCULAR; INTRAVENOUS at 10:03

## 2023-03-09 RX ADMIN — DEXAMETHASONE SODIUM PHOSPHATE 8 MG: 4 INJECTION, SOLUTION INTRA-ARTICULAR; INTRALESIONAL; INTRAMUSCULAR; INTRAVENOUS; SOFT TISSUE at 08:41

## 2023-03-09 RX ADMIN — HYDROMORPHONE HYDROCHLORIDE 0.4 MG: 0.2 INJECTION, SOLUTION INTRAMUSCULAR; INTRAVENOUS; SUBCUTANEOUS at 14:06

## 2023-03-09 RX ADMIN — MIDAZOLAM 2 MG: 1 INJECTION INTRAMUSCULAR; INTRAVENOUS at 07:55

## 2023-03-09 RX ADMIN — ACETAMINOPHEN 1000 MG: 500 TABLET ORAL at 16:47

## 2023-03-09 RX ADMIN — FENTANYL CITRATE 50 MCG: 50 INJECTION, SOLUTION INTRAMUSCULAR; INTRAVENOUS at 13:45

## 2023-03-09 RX ADMIN — HYDROMORPHONE HYDROCHLORIDE 0.4 MG: 0.2 INJECTION, SOLUTION INTRAMUSCULAR; INTRAVENOUS; SUBCUTANEOUS at 14:21

## 2023-03-09 RX ADMIN — FENTANYL CITRATE 50 MCG: 50 INJECTION, SOLUTION INTRAMUSCULAR; INTRAVENOUS at 13:59

## 2023-03-09 RX ADMIN — SODIUM CHLORIDE, POTASSIUM CHLORIDE, SODIUM LACTATE AND CALCIUM CHLORIDE: 600; 310; 30; 20 INJECTION, SOLUTION INTRAVENOUS at 07:45

## 2023-03-09 RX ADMIN — Medication 10 MG: at 12:06

## 2023-03-09 RX ADMIN — SODIUM CHLORIDE, POTASSIUM CHLORIDE, SODIUM LACTATE AND CALCIUM CHLORIDE: 600; 310; 30; 20 INJECTION, SOLUTION INTRAVENOUS at 08:28

## 2023-03-09 RX ADMIN — LIDOCAINE HYDROCHLORIDE 100 MG: 20 INJECTION, SOLUTION INFILTRATION; PERINEURAL at 07:57

## 2023-03-09 RX ADMIN — ERTAPENEM SODIUM 1 G: 1 INJECTION, POWDER, LYOPHILIZED, FOR SOLUTION INTRAMUSCULAR; INTRAVENOUS at 07:30

## 2023-03-09 RX ADMIN — FENTANYL CITRATE 50 MCG: 50 INJECTION, SOLUTION INTRAMUSCULAR; INTRAVENOUS at 13:51

## 2023-03-09 RX ADMIN — Medication 5 MG: at 07:55

## 2023-03-09 RX ADMIN — Medication 10 MG: at 13:09

## 2023-03-09 RX ADMIN — Medication 10 MG: at 12:47

## 2023-03-09 RX ADMIN — Medication 20 MG: at 11:08

## 2023-03-09 RX ADMIN — KETOROLAC TROMETHAMINE 15 MG: 15 INJECTION, SOLUTION INTRAMUSCULAR; INTRAVENOUS at 17:52

## 2023-03-09 RX ADMIN — HYDROMORPHONE HYDROCHLORIDE 0.4 MG: 0.2 INJECTION, SOLUTION INTRAMUSCULAR; INTRAVENOUS; SUBCUTANEOUS at 14:27

## 2023-03-09 RX ADMIN — Medication 10 MG: at 08:45

## 2023-03-09 RX ADMIN — PROPOFOL 30 MG: 10 INJECTION, EMULSION INTRAVENOUS at 08:00

## 2023-03-09 RX ADMIN — BUPIVACAINE 20 ML: 13.3 INJECTION, SUSPENSION, LIPOSOMAL INFILTRATION at 07:50

## 2023-03-09 RX ADMIN — GRANISETRON HYDROCHLORIDE 1 MG: 1 INJECTION, SOLUTION INTRAVENOUS at 07:18

## 2023-03-09 RX ADMIN — KETAMINE HYDROCHLORIDE 10 MG/HR: 100 INJECTION, SOLUTION, CONCENTRATE INTRAMUSCULAR; INTRAVENOUS at 08:29

## 2023-03-09 RX ADMIN — SODIUM CHLORIDE, SODIUM GLUCONATE, SODIUM ACETATE, POTASSIUM CHLORIDE AND MAGNESIUM CHLORIDE: 526; 502; 368; 37; 30 INJECTION, SOLUTION INTRAVENOUS at 08:15

## 2023-03-09 RX ADMIN — Medication 30 MG: at 07:55

## 2023-03-09 ASSESSMENT — COPD QUESTIONNAIRES: COPD: 0

## 2023-03-09 ASSESSMENT — ACTIVITIES OF DAILY LIVING (ADL)
ADLS_ACUITY_SCORE: 18
ADLS_ACUITY_SCORE: 24

## 2023-03-09 ASSESSMENT — LIFESTYLE VARIABLES: TOBACCO_USE: 0

## 2023-03-09 ASSESSMENT — ENCOUNTER SYMPTOMS
ORTHOPNEA: 0
SEIZURES: 0

## 2023-03-09 NOTE — PROGRESS NOTES
GYN ONC PROGRESS NOTE  03/10/2023    SUBJECTIVE:   Patient is feeling well.  Pain is controlled. No flatus or BM yet. Yoon in place. OOB once yesterday. Mild nausea, no vomiting.       OBJECTIVE:   Patient Vitals for the past 24 hrs:   BP Temp Temp src Pulse Resp SpO2   03/10/23 1103 102/56 -- -- 55 -- 94 %   03/10/23 0700 101/57 98  F (36.7  C) Temporal 51 17 93 %   03/10/23 0444 111/53 98.5  F (36.9  C) Oral 50 17 --   03/09/23 2200 126/65 98.3  F (36.8  C) -- 50 16 93 %   03/09/23 2000 116/63 98.2  F (36.8  C) -- 50 16 94 %   03/09/23 1900 119/50 -- -- (!) 48 12 91 %   03/09/23 1810 115/49 -- -- (!) 48 14 93 %   03/09/23 1710 128/66 96.8  F (36  C) Oral 53 17 92 %   03/09/23 1630 117/53 -- -- -- 17 94 %   03/09/23 1600 130/58 -- -- -- 16 90 %   03/09/23 1557 130/58 97.2  F (36.2  C) Oral -- 16 90 %   03/09/23 1445 133/73 97.3  F (36.3  C) Core 51 14 99 %   03/09/23 1430 127/63 -- -- (!) 44 10 98 %   03/09/23 1415 132/71 -- -- (!) 49 12 98 %   03/09/23 1400 125/67 -- -- 51 17 97 %   03/09/23 1345 126/80 -- -- 58 20 98 %   03/09/23 1340 137/73 97.2  F (36.2  C) Core 54 17 98 %     Gen- Laying in bed, NAD  CV- Well perfused  Pulm- NWOB  Abd- soft, nontender, non-distended, incision c/d/i    New Labs/Imaging-   CMP  Recent Labs   Lab 03/10/23  0654 03/08/23  1430    142   POTASSIUM 4.4 4.1   CHLORIDE 106 105   CO2 24 28   ANIONGAP 10 9   * 90   BUN 22.3* 16.9   CR 1.18* 0.84   GFRESTIMATED 54* 81   IVORY 7.9* 9.4   PROTTOTAL  --  7.1   ALBUMIN  --  4.2   BILITOTAL  --  0.5   ALKPHOS  --  145*   AST  --  32   ALT  --  26     CBC  Recent Labs   Lab 03/10/23  0654 03/08/23  1430   WBC  --  3.4*   RBC  --  4.72   HGB 11.3* 12.9   HCT  --  40.3   MCV  --  85   MCH  --  27.3   MCHC  --  32.0   RDW  --  14.0   PLT  --  146*     INRNo lab results found in last 7 days.    ASSESSMENT/RECOMMENDATIONS:   Sarah Portillo is a 57 year old who is POD#1 s/p open excision of pelvic floor recurrence, excision of pelvic  nodules, pelvic LN dissection w/ CRS and DOT BSO with gyn/onc. She is recovering well.    #s/p DOT BSO  - Discussed normal appearing uterus, ovaries, tubes  - Reviewed postoperative hysterectomy instructions, which were also placed in discharge orders   - Pelvic rest for 8 weeks postop - no tampons, douching, intercourse    - No swimming/submerging waist underwater for 8 weeks   - May have spotting or light vaginal bleeding for a few weeks. If heavy (bleeding more than a pad an hour) call doctor or go to ED    Gyn/Onc will sign off at this time. Please reach out if questions.    Henri Heredia MD MPH  OB/Gyn PGY-4  03/10/2023 11:46 AM    Narcisa Arriaga MD    Department of Ob/Gyn and Women's Health  Division of Gynecologic Oncology  [facility]  [phone]    I saw and evaluated the patient with the resident.  I edited and reviewed the above note. I have reviewed all pertinent imaging and labs on this patient.

## 2023-03-09 NOTE — ANESTHESIA POSTPROCEDURE EVALUATION
Patient: Sarah Portillo    Procedure: Procedure(s):  CYSTOSCOPY, WITH URETERAL STENT INSERTION Bilateral  LAPAROTOMY, OPEN EXCISION OF PELVIC FLOOR RECURRENCE, EXCISION OF PELVIC NODULES, RIGHT PELVIC LYMPH NODE DISSECTION, OMENTAL PEDICAL FLAP  open removal of uterus, cervix, and both ovaries and fallopian tubes       Anesthesia Type:  General    Note:  Disposition: Inpatient   Postop Pain Control: Uneventful            Sign Out: Well controlled pain   PONV: No   Neuro/Psych: Uneventful            Sign Out: Acceptable/Baseline neuro status   Airway/Respiratory: Uneventful            Sign Out: Acceptable/Baseline resp. status   CV/Hemodynamics: Uneventful            Sign Out: Acceptable CV status; No obvious hypovolemia; No obvious fluid overload   Other NRE: NONE   DID A NON-ROUTINE EVENT OCCUR? No           Last vitals:  Vitals Value Taken Time   /73 03/09/23 1445   Temp 36.3  C (97.3  F) 03/09/23 1445   Pulse 50 03/09/23 1458   Resp 10 03/09/23 1458   SpO2 98 % 03/09/23 1458   Vitals shown include unvalidated device data.    Electronically Signed By: Len Quintana MD  March 9, 2023  2:59 PM

## 2023-03-09 NOTE — LETTER
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  TBD final plans of discharge.  Pt resides in [place].]   Equipment Currently Used at Home other (see comments)  [adjustable bed]          Follow-up plan:    Future Appointments   Date Time Provider Department Center   3/14/2023  3:00 PM Dorita Dangelo, PT [facility] O   3/14/2023  7:00 PM Caryn Chaudhry OT [facility] O   3/29/2023 10:00 AM Diane Garrison PA-C [facility]   2023  3:15 PM Long Gomez MD [facility]       Any outstanding tests or procedures:              Key Recommendations:      Don Whitmore RN    AVS/Discharge Summary is the source of truth; this is a helpful guide for improved communication of patient story

## 2023-03-09 NOTE — ANESTHESIA PREPROCEDURE EVALUATION
Pre-Operative H & P     CC:  Preoperative exam to assess for increased cardiopulmonary risk while undergoing surgery and anesthesia.    Date of Encounter: 3/8/2023  Primary Care Physician:  No primary care provider on file.     Reason for visit:   No diagnosis found.    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 She was initially diagnosed in 10/2020 and underwent JULIA, chemo, radiation, and eventually a resection on 8/9/21 in [place].  During follow-up, she was diagnosed with a local recurrence in November 2022. CT guided biopsy demonstrated metastatic adenocaricnoma consistent with colorectal primary in December 2022. She had a MRI pelvis on 1/10/2023 which revealed a right posterior pelvis sidewall/pelvic floor recurrence with tethering of the cervix. PET scan on 1/13/2023 demonstrated a right posterolateral pelvic sidewall soft tissue mass. She has consulted with Dr. Gomez on 2/13/23 and the above surgery  has been recommneded for further managment.      History is obtained from the patient and chart review    Hx of abnormal bleeding or anti-platelet use: None.    Menstrual history: No LMP recorded. Patient is perimenopausal.:      Past Medical History  Past Medical History:   Diagnosis Date     Colon cancer (H)      History of pulmonary embolism 12/15/2020    Provoked by chemo and immobility. Was on warfarin x 1 year.     Hypothyroidism      Obesity        Past Surgical History  Past Surgical History:   Procedure Laterality Date     LOW ANTERIOR BOWEL RESECTION  08/09/2021    In [place]     SALPINGECTOMY      1998 d/t tubal pregnancy       Prior to Admission Medications  No current outpatient medications on file.       Allergies  No Known Allergies    Social History  Social History     Socioeconomic History     Marital status:      Spouse name: Not on file     Number of children: Not on file     Years of education: Not on file     Highest education level: Not on file   Occupational History     Not on file   Tobacco Use     Smoking status: Never     Smokeless tobacco: Never   Substance and Sexual Activity     Alcohol use: Never     Drug use: Never     Sexual activity: Not on file   Other Topics Concern     Not on file   Social History Narrative     Not on file     Social Determinants of Health     Financial Resource Strain: Not on file   Food Insecurity: Not on file   Transportation Needs: Not on file   Physical Activity: Not on file   Stress: Not on file   Social Connections: Not on file   Intimate Partner Violence: Not on file   Housing Stability: Not on file       Family History  Family History   Problem Relation Age of Onset     Venous thrombosis Nephew      Anesthesia Reaction No family hx of      Bleeding Disorder No family hx of        Review of Systems  The complete review of systems is negative other than noted in the HPI or here.   Anesthesia Evaluation   Pt has had prior anesthetic. Type: General.    No  history of anesthetic complications       ROS/MED HX  ENT/Pulmonary:    (-) tobacco use, asthma, COPD and sleep apnea   Neurologic:    (-) no seizures, no CVA and migraines   Cardiovascular:     (+) -----Previous cardiac testing   Echo: Date: Results:    Stress Test: Date: Results:    ECG Reviewed: Date: 2020 Results:  Completed before surgery in 2020 in [place]. Denies any abnormality.   Cath: Date: Results:   (-) hypertension, CAD, CHF, SANDRA, orthopnea/PND, irregular heartbeat/palpitations, valvular problems/murmurs, dyslipidemia and murmur   METS/Exercise Tolerance:  Comment: Walking 20 mins daily and climbs 4 flights of stairs in home without and exertional symptoms   Hematologic: Comments: History of provoked DVT/PE (12/15/20) in [place]    (+) History of blood clots, pt is not anticoagulated, history of blood transfusion, no previous transfusion reaction, Known PRBC Anitbodies:No  (-) anemia   Musculoskeletal:  - neg musculoskeletal ROS     GI/Hepatic: Comment: Colon CA/recurrent rectal adenocarcinoma   (-) GERD and hepatitis   Renal/Genitourinary:    (-) renal disease and nephrolithiasis   Endo:     (+) thyroid problem, hypothyroidism, Obesity,  (-) Type II DM and chronic steroid usage   Psychiatric/Substance Use:  - neg psychiatric ROS     Infectious Disease:    (-) Recent Fever, MRSA, VRE, HIV and TB   Malignancy:   (+) Malignancy, History of Other.Other CA Recurrence of colon cancer  Active status post Radiation, Chemo and Surgery.    Other:            There were no vitals taken for this visit.    Physical Exam   Constitutional: Awake, alert, cooperative, no apparent distress, and appears stated age.  Eyes: Pupils equal, round and reactive to light, extra ocular muscles intact, sclera clear, conjunctiva normal.  HENT: Normocephalic, oral pharynx with moist mucus membranes, good dentition. No goiter appreciated.   Respiratory: Clear to auscultation bilaterally, no crackles or wheezing.  Cardiovascular: Regular  rate and rhythm, normal S1 and S2, and no murmur noted.  Carotids +2, no bruits. No edema. Palpable pulses to radial and PT arteries.   GI: Normal bowel sounds, soft, non-distended, non-tender, no masses palpated, no hepatosplenomegaly.    Lymph/Hematologic: No cervical lymphadenopathy and no supraclavicular lymphadenopathy.  Genitourinary:  Deferred.  Skin: Warm and dry.   Musculoskeletal: Full ROM of neck. There is no redness, warmth, or swelling of the joints. Gross motor strength is normal.    Neurologic: Awake, alert, oriented to name, place and time. Cranial nerves II-XII are grossly intact. Gait is normal.   Neuropsychiatric: Calm, cooperative. Normal affect.     Prior Labs/Diagnostic Studies   All labs and imaging personally reviewed     EKG/ stress test - if available please see in ROS above     The patient's records and results personally reviewed by this provider.     Outside records reviewed from: Care Everywhere    LAB/DIAGNOSTIC STUDIES TODAY:      Component      Latest Ref Rng & Units 3/8/2023   WBC      4.0 - 11.0 10e3/uL 3.4 (L)   RBC Count      3.80 - 5.20 10e6/uL 4.72   Hemoglobin      11.7 - 15.7 g/dL 12.9   Hematocrit      35.0 - 47.0 % 40.3   MCV      78 - 100 fL 85   MCH      26.5 - 33.0 pg 27.3   MCHC      31.5 - 36.5 g/dL 32.0   RDW      10.0 - 15.0 % 14.0   Platelet Count      150 - 450 10e3/uL 146 (L)   % Neutrophils      % 65   % Lymphocytes      % 22   % Monocytes      % 9   % Eosinophils      % 3   % Basophils      % 1   % Immature Granulocytes      % 0   NRBCs per 100 WBC      <1 /100 0   Absolute Neutrophils      1.6 - 8.3 10e3/uL 2.2   Absolute Lymphocytes      0.8 - 5.3 10e3/uL 0.7 (L)   Absolute Monocytes      0.0 - 1.3 10e3/uL 0.3   Absolute Eosinophils      0.0 - 0.7 10e3/uL 0.1   Absolute Basophils      0.0 - 0.2 10e3/uL 0.0   Absolute Immature Granulocytes      <=0.4 10e3/uL 0.0   Absolute NRBCs      10e3/uL 0.0   Sodium      136 - 145 mmol/L 142   Potassium      3.4 - 5.3  mmol/L 4.1   Chloride      98 - 107 mmol/L 105   Carbon Dioxide (CO2)      22 - 29 mmol/L 28   Anion Gap      7 - 15 mmol/L 9   Urea Nitrogen      6.0 - 20.0 mg/dL 16.9   Creatinine      0.51 - 0.95 mg/dL 0.84   Calcium      8.6 - 10.0 mg/dL 9.4   Glucose      70 - 99 mg/dL 90   Alkaline Phosphatase      35 - 104 U/L 145 (H)   AST      10 - 35 U/L 32   ALT      10 - 35 U/L 26   Protein Total      6.4 - 8.3 g/dL 7.1   Albumin      3.5 - 5.2 g/dL 4.2   Bilirubin Total      <=1.2 mg/dL 0.5   GFR Estimate      >60 mL/min/1.73m2 81       Assessment      Sarah Portillo is a 57 year old female seen as a PAC referral for risk assessment and optimization for anesthesia.    Plan/Recommendations  Pt will be optimized for the proposed procedure.  See below for details on the assessment, risk, and preoperative recommendations    NEUROLOGY  - No history of TIA, CVA or seizure  -Post Op delirium risk factors:  No risk identified    ENT  - No current airway concerns.  Will need to be reassessed day of surgery.  Mallampati: I  TM: > 3    CARDIAC  - No history of CAD, Hypertension and Afib  - METS (Metabolic Equivalents)  Patient performs 4 or more METS exercise without symptoms            Total Score: 0      RCRI-Very low risk: Class 1 0.4% complication rate            Total Score: 0        PULMONARY  NEW Low Risk            Total Score: 2    NEW: BMI over 35 kg/m2    NEW: Over 50 ys old      - Denies asthma or inhaler use  - Tobacco History      History   Smoking Status     Never   Smokeless Tobacco     Never       GI  - Rectal adenocarcinoma. Initially diagnosed 10/2020, underwent chemo, radiation, ad a resection on 8/9/21 in [place]. Recurrence diagnosed on 11/2022. Surgery planned as above.   PONV High Risk  Total Score: 3           1 AN PONV: Pt is Female    1 AN PONV: Patient is not a current smoker    1 AN PONV: Intended Post Op Opioids        /RENAL  - Baseline Creatinine 0.87    ENDOCRINE    - BMI: Estimated body mass  "index is 44.12 kg/m  as calculated from the following:    Height as of an earlier encounter on 3/9/23: 1.854 m (6' 1\").    Weight as of an earlier encounter on 3/9/23: 151.7 kg (334 lb 7 oz).  Class 3 Obesity (BMI > 40)  - No history of Diabetes Mellitus  - Thyroid disorder  Take levothyroxine with a sip of water on the morning of surgery. Continue home replacement while hospitalized.    HEME  VTE High Risk 3%            Total Score: 9    VTE: BMI greater than 39    VTE: Pt history of VTE    VTE: Current cancer      - No history of abnormal bleeding or antiplatelet use.   -History of provoked DVT/PE (12/15/20) in [place]. It was felt it was due to chemo and a long 5 hour car ride. Was on warfain x 1 year and had IVC filter placed (now removed). Not currently anticoagulated. A bilateral LE ultrasound to assess for DVTs ordered by Dr. Gomez and completed in [place] on 2/17/23. It was negative for DVTs.     Different anesthesia methods/types have been discussed with the patient, but they are aware that the final plan will be decided by the assigned anesthesia provider on the date of service.      The patient is optimized for their procedure. AVS with information on surgery time/arrival time, meds and NPO status given by nursing staff. No further diagnostic testing indicated.      On the day of service:     Prep time: 20 minutes  Visit time: 15 minutes  Documentation time: 15 minutes  ------------------------------------------  Total time: 50 minutes      DIDI Lara CNP (supervised by DIDI Childs CNP)  Preoperative Assessment Center  [facility]  Phone: [phone]  Fax: [phone]    Physical Exam    Airway        Mallampati: II   TM distance: > 3 FB   Neck ROM: full   Mouth opening: > 3 cm    Respiratory Devices and Support         Dental       (+) Multiple visibly decayed, broken teeth      Cardiovascular          Rhythm and rate: regular and normal (-) no " murmur    Pulmonary           breath sounds clear to auscultation             Anesthesia Plan    ASA Status:  3   NPO Status:  NPO Appropriate    Anesthesia Type: General.     - Airway: ETT   Induction: Intravenous.   Maintenance: Inhalation.   Techniques and Equipment:     - Lines/Monitors: 2nd IV     - Blood: T&S     Consents    Anesthesia Plan(s) and associated risks, benefits, and realistic alternatives discussed. Questions answered and patient/representative(s) expressed understanding.    - Discussed:     - Discussed with:  Patient      - Extended Intubation/Ventilatory Support Discussed: Yes.      - Patient is DNR/DNI Status: No    Use of blood products discussed: Yes.     - Discussed with: Patient.     - Consented: consented to blood products            Reason for refusal: other.     Postoperative Care    Pain management: IV analgesics, Oral pain medications, Peripheral nerve block (Continuous).   PONV prophylaxis: Ondansetron (or other 5HT-3), Dexamethasone or Solumedrol     Comments:    Other Comments: Patient seen and examined.  Risks, benefits and alternatives to GETA discussed.  Questions answered and patient wishes to proceed

## 2023-03-09 NOTE — BRIEF OP NOTE
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]  Rectal adenocarcinoma (H) [C20]  Local recurrence of rectal cancer (H) [C20]  Post-operative diagnosis Same as pre-operative diagnosis    Procedure: Procedure(s):  CYSTOSCOPY, WITH URETERAL STENT INSERTION Bilateral  LAPAROTOMY, OPEN EXCISION OF PELVIC FLOOR RECURRENCE  possible open removal of uterus, cervix, and both ovaries  Surgeon: Surgeon(s) and Role:  Panel 1:     * Catracho Connolly MD - Primary     * Hong Dee MD - Resident - Assisting  Panel 2:     * Long Gomez MD - Primary     * Carmen Pat MD - Fellow - Assisting  Panel 3:     * Bernadette Niño MD - Primary  Anesthesia: General with Block   Estimated Blood Loss: None    Drains: 5 Greenlandic open ended ureteral catheters bilaterally. Left was placed with \"tiger tail\" facing outward.   Specimens: * No specimens in log *  Findings:   Unremakable stent placemetn .  Complications: None.  Implants: * No implants in log *        "

## 2023-03-09 NOTE — ANESTHESIA PROCEDURE NOTES
TAP Procedure Note    Pre-Procedure   Staff -        Anesthesiologist:  Kendall Dominguez MD       Resident/Fellow: Len Quintana MD       Performed By: resident       Location: OR       Procedure Start/Stop Times: 3/9/2023 7:50 AM and 3/9/2023 8:00 AM       Pre-Anesthestic Checklist: patient identified, IV checked, site marked, risks and benefits discussed, informed consent, monitors and equipment checked, pre-op evaluation, at physician/surgeon's request and post-op pain management  Timeout:       Correct Patient: Yes        Correct Procedure: Yes        Correct Site: Yes        Correct Position: Yes        Correct Laterality: N/A        Site Marked: N/A  Procedure Documentation  Procedure: TAP       Diagnosis: POST OP ANALGESIA       Laterality: bilateral       Patient Position: supine       Skin prep: Chloraprep       Needle Type: short bevel       Needle Gauge: 21.        Needle Length (millimeters): 110        Ultrasound guided       1. Ultrasound was used to identify targeted nerve, plexus, vascular marker, or fascial plane and place a needle adjacent to it in real-time.       2. Ultrasound was used to visualize the spread of anesthetic in close proximity to the above referenced structure.       3. A permanent image is entered into the patient's record.    Assessment/Narrative         The placement was negative for: blood aspirated and site bleeding       Bolus given via needle. no blood aspirated via catheter.        Secured via.        Insertion/Infusion Method: Single Shot       Complications: none       Injection made incrementally with aspirations every 5 mL.    Medication(s) Administered   Bupivacaine 0.25% PF (Infiltration) - Infiltration   20 mL - 3/9/2023 7:50:00 AM  Bupivacaine liposome (Exparel) 1.3% LA inj susp (Infiltration) - Infiltration   20 mL - 3/9/2023 7:50:00 AM  Medication Administration Time: 3/9/2023 7:50 AM      FOR [facility] ([facility] [place]) ONLY:   Pain Team Contact information: please page  "the Pain Team [facility]. Search \"Pain\". During daytime hours, please page the attending first. At night please page the resident first.    "

## 2023-03-09 NOTE — OP NOTE
Gynecologic Oncology Operative Report    Sarah Portillo  [id number]  3/9/2023    Pre-operative Diagnosis:  Recurrent colon cancer    Post-operative Diagnosis:  Same    Surgeon:  Bernadette Viveros MD    Assistants:  Anselmo Quiñonez MD, Fellow, Henri Heredia MD, PGY-4    Anesthesia:  General endotracheal with TAP block    Procedure:  Total abdominal hysterectomy, bilateral salpingo-oophorectomy, other procedures by Dr Gomze and Mohsen, see their separately dictated operative reports for details    EBL:  20 cc for our portion of the procedure    IVF:  See anesthesia record for details    UOP:  See anesthesia record for details    Specimens:  1.  Uterus, cervix, bilateral ovaries and fallopian tubes    Complications:  None apparent    Indications for procedure:  Sarah Portillo is a 57 year old who had a rectal cancer initially treated with chemotherapy followed by radiation and then surgery.  Unfortunately in November, 2022 she was found to have an isolated pelvic recurrence.  I was asked to assist with the procedure to remove the uterus and adnexa to improve visualization for their resection.  Following a thorough discussion of the risks, benefits, indications and alternatives she consented to the above procedure.    Operative Findings:  Normal appearing bilateral ovaries and fallopian tubes.  Rectal cancer recurrence palpated posterior to the vagina without attachment to the cervix or vagina.    Operative Procedure:  The consent was reviewed with the patient in the preoperative setting. She received prophylactic antibiotics. In addition, she received heparin for venous thrombosis prevention.  She was subsequently transferred to the operating room for the above-mentioned procedure. Dr Gomez initiated the procedure and opened the abdomen and provided good pelvic exposure.  I was then called to perform the hysterectomy. Timeout was called at which point the patient's name, operative procedure and site was  confirmed by the operative team. Uterus was then elevated with clamps placed on both uterine cornua.  Initially on the right side, the round ligament was isolated, transected with cautery.  We extended the peritoneal incision of the right aspect of the psoas muscle and the right retroperitoneal space was entered.  The right ureter was identified deep in the pelvis and a window was made in the medial leaf of the broad ligament ensuring to stay well above the level of the ureter.  The right ovarian vessels were then isolated, cauterized and divided with the LigaSure device.  Attention was then turned to the left and the round ligament was isolated, transected with cautery. We extended the peritoneal incision over the left aspect of the psoas muscle. Left retroperitoneal exploration was performed. We identified the ureter deep in the pelvis. Well above that site, a window was made in the medial leaf of the broad ligament and the left ovarian vessels were then isolated, cauterized and divided with the LigaSure device. The peritoneal incision over the lower uterine segment was incised. The vesicouterine peritoneum was then fully developed. The bladder was retracted to the level of the upper vagina where there were some mild adhesions from the bladder to the anterior uterus. Bilateral uterine arteries were now isolated, cauterized and divided with the LigaSure device. The bilateral cardinal ligaments were next clamped and cut and suture ligated with Vicryl. We proceeded until we could isolate out the uterosacral ligaments, which were similarly cut and suture ligated.  The apex of the vagina was then clamped and the nik scissors was used to perform the colpotomy and the full cervix was resected along with the uterus and the bilateral ovaries and fallopian tubes. The two apices of the vaginal cuff were then suture ligated and tagged for identification later.  We left the remainder of the vagina open at the request of  Dr Gmoez to aid in visualization for his portion of the procedure.  We then returned to the OR once his portion of the procedure was complete and closed the vagina with interrupted figure of eight Vicryl sutures with excellent hemostasis and reapproximation. The patient remained in the OR for the completion of the procedure with Dr Gomez.     Bernadette Viveros MD  Gynecologic Oncology  [facility]

## 2023-03-09 NOTE — BRIEF OP NOTE
[facility]    Brief Operative Note    Pre-operative diagnosis: Rectal adenocarcinoma (H) [C20]  Local recurrence of rectal cancer (H) [C20]  Post-operative diagnosis Same    Procedure: Procedure(s):  CYSTOSCOPY, WITH URETERAL STENT INSERTION Bilateral  LAPAROTOMY, OPEN EXCISION OF PELVIC FLOOR RECURRENCE, EXCISION OF PELVIC NODULES, RIGHT PELVIC LYMPH NODE DISSECTION, OMENTAL PEDICAL FLAP  open removal of uterus, cervix, and both ovaries and fallopian tubes    Surgeon: Surgeon(s) and Role:  Panel 1:     * Catracho Connolly MD - Primary     * Hong Dee MD - Resident - Assisting  Panel 2:     * Long Gomez MD - Primary     * Mich Lima MD - Resident - Assisting     * Carmen Pat MD - Fellow - Assisting  Panel 3:     * Bernadette Niño MD - Primary     * Oliverio Heredia MD - Resident - Assisting     * Anselmo Quiñonez MD - Fellow - Assisting    Anesthesia: General with Block     Estimated Blood Loss: 150 ml    Drains: Yoon catheter (to be removed within 48hrs)    Specimens:   ID Type Source Tests Collected by Time Destination   1 : Umbilicus Tissue Umbilicus SURGICAL PATHOLOGY EXAM Long Gomez MD 3/9/2023  8:55 AM    2 : Peritoneal Nodule Tissue Other SURGICAL PATHOLOGY EXAM Long Gmoez MD 3/9/2023  8:56 AM    3 : uterus, cervix, bilateral tubes and ovaries Tissue Uterus, Cervix, Bilateral Fallopian Tubes & Ovaries SURGICAL PATHOLOGY EXAM Catracho Connolly MD 3/9/2023  9:32 AM    4 : Right Lateral Pelvic Margins Tissue Other SURGICAL PATHOLOGY EXAM Long Gomez MD 3/9/2023 10:37 AM    5 : Right Pelvic Nodule Tissue Other SURGICAL PATHOLOGY EXAM Long Gomez MD 3/9/2023 10:41 AM    6 : Right Pelvic Margin #2 Tissue Other SURGICAL PATHOLOGY EXAM Catracho Connolly MD 3/9/2023 11:43 AM    7 : Right Pelvic Nodule #2 Tissue Other SURGICAL PATHOLOGY EXAM  Long Gomez MD 3/9/2023 11:26 AM    8 : Right  Pelvic Margin #3 Tissue Other SURGICAL PATHOLOGY EXAM Long Gomez MD 3/9/2023 11:39 AM    9 : Right  Pelvic Margin #4 Tissue Other SURGICAL PATHOLOGY EXAM Long Gomez MD 3/9/2023 11:40 AM    10 : Right Pelvic Lymph Node Dissection Tissue Other SURGICAL PATHOLOGY EXAM Long Gomez MD 3/9/2023 12:01 PM    11 : Small Bowel Nodule Tissue Other SURGICAL PATHOLOGY EXAM Long Gomez MD 3/9/2023 12:32 PM      Findings:   Firm nodule palpable at the lateral pelvic wall resected. Margin initially positive; repeat margins negative on frozen.  Ureters identified and protected.   Complications: None.  Implants: * No implants in log *

## 2023-03-09 NOTE — OP NOTE
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     SPECIMEN:    * No specimens in log *    SIGNIFICANT FINDINGS:   Unremarkable bilateral ureteral stent placement     BRIEF OPERATIVE INDICATIONS: Sarah Portillo is a 57 year old female who presented with recurrent rectal adenocarcinoma and is here for resection of a recurrent mass. Urology was asked to place stents for identification of the ureters intra-op.     DESCRIPTION OF PROCEDURE: After full informed voluntary consent was obtained, the patient was transported to the operating room, placed supine on the table. After adequate anesthesia was induced, they were prepped and draped in the usual sterile fashion. A timeout was taken to confirm correct patient, procedure and laterality and the patient was re-positioned in dorsal lithotomy.     A 22F rigid cystoscope was inserted into a well lubricated urethra. The bladder was unremarkable without tumors stones or diverticuli. Bilateral ureteral orifices were orthotopic. The right ureteral orifice was cannulated with the aid of a sensor wire which was placed into the renal pelvis under tactile guidance. A 5 fr x 70 cm open ended ureteral stent was advanced with minimal resistance to the renal pelvis again under tactile guidance. The same procedure was repeated on the left, although the 5-Kinyarwanda was flipped in orientation (Tiger tails distally) to provide directionality intra-op.     A 16F penn catheter was then inserted into the  bladder with 10 mL in the balloon and the 5-Sri [place]ankan catheters were internalized to the end of the catheter and secured using silk ties. This concluded urology's portion of the procedure.     Addendum:     Were called intra-op to inspect the right ureter which was mobilized and had some mild cautery artifact in the distal ~1/3rd of the ureter. Discussed with Dr. Eller who discussed with Dr. Connolly and felt no further intervention was necessary. Stents can come out at the end of the case.     Post-op plan:   - 5 Sri Lankan catheters may be removed at the end of the case

## 2023-03-09 NOTE — ANESTHESIA CARE TRANSFER NOTE
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    Anesthesia Type:   General     Note:    Oropharynx: oropharynx clear of all foreign objects and spontaneously breathing  Level of Consciousness: awake  Oxygen Supplementation: nasal cannula  Level of Supplemental Oxygen (L/min / FiO2): 3  Independent Airway: airway patency satisfactory and stable  Dentition: dentition unchanged  Vital Signs Stable: post-procedure vital signs reviewed and stable  Report to RN Given: handoff report given  Patient transferred to: PACU    Handoff Report: Identifed the Patient, Identified the Reponsible Provider, Reviewed the pertinent medical history, Discussed the surgical course, Reviewed Intra-OP anesthesia mangement and issues during anesthesia, Set expectations for post-procedure period and Allowed opportunity for questions and acknowledgement of understanding      Vitals:  Vitals Value Taken Time   /80 03/09/23 1345   Temp 36.1    Pulse 54 03/09/23 1350   Resp 14 03/09/23 1350   SpO2 98 % 03/09/23 1350   Vitals shown include unvalidated device data.    Electronically Signed By: DIDI Singer CRNA  March 9, 2023  1:51 PM

## 2023-03-09 NOTE — ANESTHESIA PROCEDURE NOTES
Airway       Patient location during procedure: OR       Procedure Start/Stop Times: 3/9/2023 8:03 AM  Staff -        CRNA: Karen Schilling APRN CRNA       Performed By: CRNA  Consent for Airway        Urgency: elective  Indications and Patient Condition       Indications for airway management: nayeli-procedural       Induction type:intravenous       Mask difficulty assessment: 1 - vent by mask    Final Airway Details       Final airway type: endotracheal airway       Successful airway: ETT - single and Oral  Endotracheal Airway Details        ETT size (mm): 7.0       Cuffed: yes       Cuff volume (mL): 8       Successful intubation technique: direct laryngoscopy       DL Blade Type: Cerrato 2       Grade View of Cords: 1       Adjucts: stylet       Position: Right       Measured from: gums/teeth       Secured at (cm): 22       Bite block used: None    Post intubation assessment        Placement verified by: capnometry, equal breath sounds and chest rise        Number of attempts at approach: 1       Secured with: pink tape       Ease of procedure: easy       Dentition: Intact and Unchanged    Medication(s) Administered   Medication Administration Time: 3/9/2023 8:03 AM

## 2023-03-10 ENCOUNTER — APPOINTMENT (OUTPATIENT)
Dept: PHYSICAL THERAPY | Facility: CLINIC | Age: 58
End: 2023-03-10
Attending: COLON & RECTAL SURGERY
Payer: COMMERCIAL

## 2023-03-10 LAB
ANION GAP SERPL CALCULATED.3IONS-SCNC: 10 MMOL/L (ref 7–15)
BUN SERPL-MCNC: 22.3 MG/DL (ref 6–20)
CALCIUM SERPL-MCNC: 7.9 MG/DL (ref 8.6–10)
CHLORIDE SERPL-SCNC: 106 MMOL/L (ref 98–107)
CREAT SERPL-MCNC: 1.18 MG/DL (ref 0.51–0.95)
DEPRECATED HCO3 PLAS-SCNC: 24 MMOL/L (ref 22–29)
GFR SERPL CREATININE-BSD FRML MDRD: 54 ML/MIN/1.73M2
GLUCOSE SERPL-MCNC: 127 MG/DL (ref 70–99)
HGB BLD-MCNC: 11.3 G/DL (ref 11.7–15.7)
MAGNESIUM SERPL-MCNC: 1.7 MG/DL (ref 1.7–2.3)
PHOSPHATE SERPL-MCNC: 2.6 MG/DL (ref 2.5–4.5)
POTASSIUM SERPL-SCNC: 4.4 MMOL/L (ref 3.4–5.3)
SODIUM SERPL-SCNC: 140 MMOL/L (ref 136–145)

## 2023-03-10 PROCEDURE — G0463 HOSPITAL OUTPT CLINIC VISIT: HCPCS

## 2023-03-10 PROCEDURE — 258N000003 HC RX IP 258 OP 636: Performed by: COLON & RECTAL SURGERY

## 2023-03-10 PROCEDURE — 250N000013 HC RX MED GY IP 250 OP 250 PS 637: Performed by: COLON & RECTAL SURGERY

## 2023-03-10 PROCEDURE — 80048 BASIC METABOLIC PNL TOTAL CA: CPT | Performed by: COLON & RECTAL SURGERY

## 2023-03-10 PROCEDURE — 250N000011 HC RX IP 250 OP 636: Performed by: COLON & RECTAL SURGERY

## 2023-03-10 PROCEDURE — 36415 COLL VENOUS BLD VENIPUNCTURE: CPT | Performed by: COLON & RECTAL SURGERY

## 2023-03-10 PROCEDURE — 84100 ASSAY OF PHOSPHORUS: CPT | Performed by: COLON & RECTAL SURGERY

## 2023-03-10 PROCEDURE — 97161 PT EVAL LOW COMPLEX 20 MIN: CPT | Mod: GP | Performed by: PHYSICAL THERAPIST

## 2023-03-10 PROCEDURE — P9045 ALBUMIN (HUMAN), 5%, 250 ML: HCPCS | Performed by: COLON & RECTAL SURGERY

## 2023-03-10 PROCEDURE — 83735 ASSAY OF MAGNESIUM: CPT | Performed by: COLON & RECTAL SURGERY

## 2023-03-10 PROCEDURE — 120N000002 HC R&B MED SURG/OB UMMC

## 2023-03-10 PROCEDURE — 85018 HEMOGLOBIN: CPT | Performed by: COLON & RECTAL SURGERY

## 2023-03-10 PROCEDURE — 97530 THERAPEUTIC ACTIVITIES: CPT | Mod: GP | Performed by: PHYSICAL THERAPIST

## 2023-03-10 RX ORDER — FUROSEMIDE 10 MG/ML
20 INJECTION INTRAMUSCULAR; INTRAVENOUS ONCE
Status: COMPLETED | OUTPATIENT
Start: 2023-03-10 | End: 2023-03-10

## 2023-03-10 RX ADMIN — ACETAMINOPHEN 1000 MG: 500 TABLET ORAL at 20:34

## 2023-03-10 RX ADMIN — ALBUMIN HUMAN 500 ML: 50 SOLUTION INTRAVENOUS at 08:22

## 2023-03-10 RX ADMIN — KETOROLAC TROMETHAMINE 15 MG: 15 INJECTION, SOLUTION INTRAMUSCULAR; INTRAVENOUS at 01:00

## 2023-03-10 RX ADMIN — LEVOTHYROXINE SODIUM 150 MCG: 0.15 TABLET ORAL at 08:40

## 2023-03-10 RX ADMIN — KETOROLAC TROMETHAMINE 15 MG: 15 INJECTION, SOLUTION INTRAMUSCULAR; INTRAVENOUS at 23:56

## 2023-03-10 RX ADMIN — ACETAMINOPHEN 1000 MG: 500 TABLET ORAL at 12:09

## 2023-03-10 RX ADMIN — FUROSEMIDE 20 MG: 10 INJECTION, SOLUTION INTRAVENOUS at 15:13

## 2023-03-10 RX ADMIN — ENOXAPARIN SODIUM 40 MG: 40 INJECTION SUBCUTANEOUS at 15:13

## 2023-03-10 RX ADMIN — ACETAMINOPHEN 1000 MG: 500 TABLET ORAL at 08:40

## 2023-03-10 RX ADMIN — ACETAMINOPHEN 1000 MG: 500 TABLET ORAL at 16:44

## 2023-03-10 RX ADMIN — KETOROLAC TROMETHAMINE 15 MG: 15 INJECTION, SOLUTION INTRAMUSCULAR; INTRAVENOUS at 18:24

## 2023-03-10 RX ADMIN — SODIUM CHLORIDE, POTASSIUM CHLORIDE, SODIUM LACTATE AND CALCIUM CHLORIDE: 600; 310; 30; 20 INJECTION, SOLUTION INTRAVENOUS at 05:21

## 2023-03-10 RX ADMIN — KETOROLAC TROMETHAMINE 15 MG: 15 INJECTION, SOLUTION INTRAMUSCULAR; INTRAVENOUS at 06:05

## 2023-03-10 RX ADMIN — KETOROLAC TROMETHAMINE 15 MG: 15 INJECTION, SOLUTION INTRAMUSCULAR; INTRAVENOUS at 12:07

## 2023-03-10 RX ADMIN — SODIUM CHLORIDE, POTASSIUM CHLORIDE, SODIUM LACTATE AND CALCIUM CHLORIDE: 600; 310; 30; 20 INJECTION, SOLUTION INTRAVENOUS at 15:25

## 2023-03-10 RX ADMIN — ERTAPENEM SODIUM 1 G: 1 INJECTION, POWDER, LYOPHILIZED, FOR SOLUTION INTRAMUSCULAR; INTRAVENOUS at 09:04

## 2023-03-10 ASSESSMENT — ACTIVITIES OF DAILY LIVING (ADL)
ADLS_ACUITY_SCORE: 24
DEPENDENT_IADLS:: INDEPENDENT
ADLS_ACUITY_SCORE: 24
ADLS_ACUITY_SCORE: 24
ADLS_ACUITY_SCORE: 22
ADLS_ACUITY_SCORE: 24

## 2023-03-10 NOTE — PROVIDER NOTIFICATION
1345  Brigid [place]YALA [id number]  7C 7415-2 G.K.   Pts BPs soft - 95/42 MAP 59, 98/52 MAP 60. Do you want me to hold Lasix 20 mg ? Thank you!

## 2023-03-10 NOTE — OP NOTE
Procedure Date: 03/09/2023.    PREOPERATIVE DIAGNOSES:  1.  Recurrent rectal adenocarcinoma.  2.  Hypothyroidism.  3.  History of pulmonary embolism.  4.  Morbid obesity (body mass index of 43).  5.  ECOG score 0.    POSTOPERATIVE DIAGNOSES:   1.  Recurrent rectal adenocarcinoma.  2.  Hypothyroidism.  3.  History of pulmonary embolism.  4.  Morbid obesity (body mass index of 43).  5.  ECOG score 0.    ANESTHESIA:  General endotracheal anesthesia plus bilateral TAP blocks.    PROCEDURES PERFORMED:   1.  Cystoscopy with bilateral ureteral stents (per Dr. Connolly).    2.  Open excision of pelvic floor recurrence and excision of pelvic nodules.  3.  Right pelvic lymph node dissection.  4.  Omental pedicle flap.    SURGEON:  Long Gomez MD    ASSISTANT:  Carmen Pat MD (Colon & Rectal Surgery Fellow).    BRIEF HISTORY:  Sarah is a 57-year-old pleasant lady who was diagnosed with an mT3b N0 distal rectal adenocarcinoma in 10/2020.  Her mismatch repair genes were intact at that time.  She received total neoadjuvant therapy with 8 cycles of FOLFOX followed by long course chemoradiotherapy.  She subsequently underwent abdominoperineal resection in 08/2021 in [place] by Micheal Rosario and Hillary.  Her postoperative recovery was largely uneventful.  Final pathology showed a 2.5 cm tumor with negative surgical margins (closest margin was at 3 cm) with 0/24 lymph nodes in the specimen.  She had appropriate surveillance and unfortunately was diagnosed with a local recurrence in 11/2022.  This was confirmed with a CT-guided biopsy that showed metastatic adenocarcinoma consistent with colorectal primary.  A subsequent MR pelvis revealed a right posterior pelvic sidewall/pelvic floor recurrence and measured 2.7 x 2.0 x 3.4 cm that was tethered by the cervix.  She also had a PET scan at that time that showed no evidence of distant metastatic disease.  Her CEA was 2.6.  Her case was discussed at a multidisciplinary colorectal  "cancer conference and given how soon it was from the interval of her JULIA, we agreed to proceed with surgical resection with curative intent.  I did perform bilateral lower extremity venous duplex scans on her because of her history of PE and these were negative for DVT.  I thoroughly discussed the risks, benefits and alternatives of operative treatment with Sarah, and she agreed to proceed.    DESCRIPTION OF OPERATION:  After obtaining informed consent, the patient was brought to the operating room and placed in the modified lithotomy position.  General endotracheal anesthesia was gently induced without difficulty.  The patient received appropriate preoperative antibiotic prophylaxis as well as mechanical and chemical DVT prophylaxis.  She also underwent bilateral TAP blocks. The abdomen and perineum were prepped and draped in the standard sterile fashion.  A \"timeout\" was performed.  Dr. Connolly performed cystoscopy with insertion of bilateral ureteral stents.  Please see his operative report for full details.  A Yoon catheter was inserted.  A midline incision was placed mainly at the lower abdomen.  We did excise the umbilicus and this was sent for permanent pathology.  Upon entry to the peritoneal cavity, there was no evidence of metastatic disease.  An XXL Leroy wound protector was placed as well as a Speed-Tract surgical retractor for exposure.  The small intestine was not adhered down in the pelvis and it was relatively void of adhesions.  We did run the small bowel from ligament of Treitz all the way to the ileocecal valve and I did find one small nodule that appeared to be inflammatory.  This was excised and sent for frozen section and was negative for malignancy.  There was one additional nodule at a previous trocar site at the right lower abdomen.  This was also excised and sent for permanent pathology.  We were able to retract the small bowel to the upper abdomen.  The colostomy site appeared to be " healthy with no significant hernia.  The nodule that represented her recurrent cancer was at the right lower pelvic floor and seemed to be separate from the cervix or the vagina.  Dr. Viveros then performed total abdominal hysterectomy with bilateral salpingo-oophorectomy.  Please see her operative report for full details.  We did leave the vaginal cuff open to better palpate any connecting areas of recurrence.  The peritoneum overlying the right pelvic sidewall was excised all the way down to the area of the recurrence.  We did excise the area of recurrence with at least 1 cm margins.  This was quite difficult given that there was so much induration and desmoplastic reaction from her previous surgery and from her previous pelvic radiation.  Both ureters were clearly identified and care was taken to not injure these structures during the dissection.  After removing the nodule, I sent additional specimens for frozen section to determine that there was a negative margin.  Unfortunately, these did come back positive for cancer; therefore, I excised an extra area of pelvic floor including the iliococcygeus muscle and the right piriformis muscle.  This was all sent as a separate specimen.  Additional margins were excised and sent for frozen section and these returned as negative for malignancy.  The entire excision bed was fulgurated with monopolar electrocautery and clips were placed at this site to better identify this area in the future on imaging.  We then performed right pelvic lymph node dissection by including the two triangles that were inferior to the internal iliac vessels going all the way to the lateral sacrum and to the anococcygeal ligament.  The other triangle included the external iliac vessels and the ureter as well as the obturator internus muscle as margins.  There was no clear lymphadenopathy at the site, but all of the containing soft tissue was excised.  Care was taken to not injure the right ureter  during this dissection.  Hemostasis was corroborated.  After this, the surgical retractor was taken down and an omental pedicle flap was created based off of the left gastroepiploic vessels.  This easily reached down into the pelvis.  We then corroborated that we had adequate hemostasis and placed the omentum down in the pelvis.  Hemostasis was corroborated.  Multiple sheets of Seprafilm were left in the pelvis and peritoneal cavity in general.  Instrument, sponge, and needle counts were all correct as reported to me.  The fascia was reapproximated with a combination of a running #1 Prolene suture and multiple #1 Prolene internal retention sutures.  The wound was irrigated with dilute peroxide.  The deep dermis was reapproximated with a running 3-0 Vicryl suture.  The skin was reapproximated with a running 4-0 Monocryl subcuticular suture and Exofin fusion.  The patient tolerated the procedure well.    COMPLICATIONS:  None immediately.    ESTIMATED BLOOD LOSS:  150 mL.     REPLACEMENT:  2700 mL of crystalloid.    DRAINS AND TUBES:  Yoon catheter.    SPECIMENS:  Umbilicus, peritoneal nodule, uterus, cervix, bilateral fallopian tubes and ovaries, right lateral pelvic margins, right pelvic nodule, right pelvic margin #2, right pelvic nodule #2, right pelvic margin #3, right pelvic margin #4, and small bowel nodule.    FINDINGS:  Nodularity quite difficult to determine given the amount of induration and postradiation fibrosis.  Final frozen sections showed negative margins intraoperatively.  Both ureteral stents were removed at the end of the operation.      Long Gomez MD        D: 2023   T: 2023   MT: [id number]    Name:     AMIRA WILLIAM  MRN:      [id number]        Account:        [id number]   :      1965           Procedure Date: 2023     Document: [id number]    cc:  MD Bernadette Santos MD

## 2023-03-10 NOTE — PROGRESS NOTES
Admitted/transferred from: PACU  2 RN skin assessment completed by Gisell Jimenez RN and Bailee HURTADO RN  Skin assessment finding: Midline incision with skin glue, colostomy, sonal PIV.  Interventions/actions: No adjustment needed  Will continue to monitor.

## 2023-03-10 NOTE — CONSULTS
Care Management Initial Consult    General Information  Assessment completed with: Patient,    Type of CM/SW Visit: Initial Assessment    Primary Care Provider verified and updated as needed: Yes   Readmission within the last 30 days: no previous admission in last 30 days      Reason for Consult: discharge planning  Advance Care Planning:          Communication Assessment  Patient's communication style: spoken language (English or Bilingual)    Hearing Difficulty or Deaf: no   Wear Glasses or Blind: no    Cognitive  Cognitive/Neuro/Behavioral: WDL                      Living Environment:   People in home: spouse  Juno (/Daughter Karmen also assisting with mothers cares and follow up needs.)  Current living Arrangements: [place] (/[place]      Able to return to prior arrangements:  Yes      Family/Social Support:  Supportive  Care provided by: child(mario)  Provides care for:    Marital Status:              Description of Support System: Supportive       Current Resources:   Patient receiving home care services: No (TBD if needed this post op period.)     Community Resources:    Equipment currently used at home: other (see comments) (adjustable bed)  Supplies currently used at home:  Colostomy supplies:  DME Company in [place] is [employer] at phone- 9     Employment/Financial:  Employment Status:          Financial Concerns:   None mentioned.      Lifestyle & Psychosocial Needs:  (From Chart Flow Sheet)  Social Determinants of Health     Tobacco Use: Low Risk      Smoking Tobacco Use: Never     Smokeless Tobacco Use: Never     Passive Exposure: Not on file   Alcohol Use: Not on file   Financial Resource Strain: Not on file   Food Insecurity: Not on file   Transportation Needs: Not on file   Physical Activity: Not on file   Stress: Not on file   Social Connections: Not on file   Intimate Partner Violence: Not on file   Depression: Not at risk     PHQ-2 Score: 0   Housing Stability: Not on file      Functional Status:  Prior to admission patient needed assistance: independent and help from family as needed.     Mental Health Status: no concerns at this time.          Chemical Dependency Status:        Values/Beliefs:  Spiritual, Cultural Beliefs, Yazidi Practices, Values that affect care: no             Additional Information:    Patient continues to progress well this post operative period and per bedside RN, blood pressure is labile at this time. Family is at bedside and is supportive.  Patient has an existing colostomy and her DME company is as above.  Patient stated that after her ostomy procedure in the past, home care was recommended but, it was costly under her insurance plan and she declined home care services at that time after she learned her own ostomy cares with the help of her family.  Patient does not have a designated primary MD at this time but, her ask is that all post care needs and request go to her surgeon in [place], Dr. Divya Thornton MD at phone: [phone] and address [address] [place] [address], [address], [facility], [id number].    Inpatient cares continue per MD team plans of care this post operative period and the Department of Care Management will continue to follow for transition needs once known.  Patient is independent with her ostomy care and the [facility] RN Consult Team will follow as needed this post op period.  ____________________________________      Cathie De La Vega,  B.S.N., R.N., P.H.N.  Administrative Nurse Supervisor/[facility]  Care Coordinator Nurse Float/Replaced by [facility]  Pager today only:  [phone]

## 2023-03-10 NOTE — CONSULTS
Care Management Initial Consult    General Information  Assessment completed with: Patient,    Type of CM/SW Visit: Initial Assessment    Primary Care Provider verified and updated as needed: Yes   Readmission within the last 30 days: no previous admission in last 30 days      Reason for Consult: discharge planning  Advance Care Planning: Advance Care Planning Reviewed: no concerns identified, concerns discussed        Communication Assessment  Patient's communication style: spoken language (English or Bilingual)    Hearing Difficulty or Deaf: no   Wear Glasses or Blind: no    Cognitive  Cognitive/Neuro/Behavioral: WDL                      Living Environment:   People in home: spouse  Juno (/Daughter Karmen also assisting with mothers cares and follow up needs.)  Current living Arrangements: house (/[place]      Able to return to prior arrangements: yes     Family/Social Support:  Care provided by: child(mario)  Provides care for: no one  Marital Status:   , Children, Sibling(s), Other (specify)          Description of Support System: Supportive    Support Assessment: Adequate family and caregiver support, Adequate social supports    Current Resources:   Patient receiving home care services: No (TBD if needed this post op period.)     Community Resources:    Equipment currently used at home: other (see comments) (adjustable bed)  Supplies currently used at home: None    Employment/Financial:  Employment Status: employed full-time        Financial Concerns:     Referral to Financial Worker: No       Lifestyle & Psychosocial Needs:  Social Determinants of Health     Tobacco Use: Low Risk      Smoking Tobacco Use: Never     Smokeless Tobacco Use: Never     Passive Exposure: Not on file   Alcohol Use: Not on file   Financial Resource Strain: Not on file   Food Insecurity: Not on file   Transportation Needs: Not on file   Physical Activity: Not on file   Stress: Not on file   Social Connections: Not on  file   Intimate Partner Violence: Not on file   Depression: Not at risk     PHQ-2 Score: 0   Housing Stability: Not on file       Functional Status:  Prior to admission patient needed assistance:   Dependent ADLs:: Independent  Dependent IADLs:: Independent  Assesssment of Functional Status: At functional baseline    Mental Health Status:  Mental Health Status: No Current Concerns     Chemical Dependency Status:  Chemical Dependency Status: No Current Concerns           Values/Beliefs:  Spiritual, Cultural Beliefs, Pentecostal Practices, Values that affect care: no           Additional Information:  Met with pt/family at bedside for CM assessment. Pt had no PCP on file but provided PCP contact infos.  CM contact registration and request for PCP update to support internal hand off post hospitalization. That orders for medical supplies should be send to [facility]    Discuss the need for ACP and pt accept to be provided with blank copies of [facility] for [facility] for her review and completion.      Assess for psychosocial concern and pt report no concern. No discharge need anticipated at this time     PCP   Fox Thornton DO  [address] [place], [place] [id number] [phone]    [facility]  Phone: [phone]    [facility]  P:   Pager: [phone]  F: [phone]  Weekend Pager: [phone]  Weekend Coverage: 7C; 7D; 5C

## 2023-03-10 NOTE — CONSULTS
"Assessment of Established end Colostomy:  How long has patient had ostomy: since 8/2021  Stoma: healthy and pink  Mucutaneous junction: not visualized (barrier in place)  Peristomal skin: none- no issue per patient   Output: bowel sweat    Is patient independent with ostomy care?: Yes  Home pouching system: [place] soft convex pouch cut to fit cut to 1 3/8\"1/2 of convex oval barrier ring and 2\" adapt barrier ring.  Pouching issues and/or educational needs:none, patient can resume prior to admission home pouching, and had no concerns with her current pouching plan.   Interventions completed today: Pouching system assessment   Pouching system in use while hospitalized:  [place] one piece, cut to fit and convex    LLQ Colostomy pouching plan:   Pouching system: ostomy supplies pouches: Shital 55 Cera Plus Soft Convex FECAL ([id number])   Accessories used: [facility] ostomy accessories: 2\" Adapt Barrier Ring ([id number]) and MEDIUM Convex Oval Ring ([id number])   Frequency of pouch changes: Twice weekly and PRN leakage  [facility] follow up plan: signing off  Bedside RN interventions: Patient independent with cares and assist patient to order additional pouching supplies while inpatient if needed.      Supplies location: discussed with patient         "

## 2023-03-10 NOTE — PROGRESS NOTES
"CLINICAL NUTRITION SERVICES    Received consult: Provider order - Nutrition Education \"Low residue diet teaching\".  Pt with other cares during multiple attempts to visit today, will attempt back at later time as able.  RD will continue to follow per LOS protocol and/or if re-consulted.     Lidia Yeh RD, , LD  Weekday Pager: [phone]  Weekday Units covered: 7C (all beds) and 5A (beds 5201 through 5209)  Weekend/Holiday RD Pager: [phone]    "

## 2023-03-10 NOTE — PROGRESS NOTES
Colorectal Surgery Progress Note  [facility]  POD#1      Subjective:  Patient feels well today. She reports some mild nausea without emesis. She endorses hiccups. Her pain is controlled with her current regimen. She is urinating via her penn catheter. She has not passed flatus or had a BM. She stood yesterday but has not yet ambulated    Vitals:  Vitals:    03/09/23 2000 03/09/23 2200 03/10/23 0444 03/10/23 0700   BP: 116/63 126/65 111/53 101/57   BP Location:  Right arm Right arm Right arm   Cuff Size:  Adult Large Adult Large    Pulse: 50 50 50 51   Resp: 16 16 17 17   Temp: 98.2  F (36.8  C) 98.3  F (36.8  C) 98.5  F (36.9  C) 98  F (36.7  C)   TempSrc:   Oral Temporal   SpO2: 94% 93%  93%   Weight:       Height:         I/O:  I/O last 3 completed shifts:  In: 2681 [I.V.:2681]  Out: 1120 [Urine:970; Blood:150]    Physical Exam:  Gen: AAOx3, NAD  Pulm: Non-labored breathing on 2LNC  Abd: Soft, non-distended, appropriately tender, no guarding. Abdominal binder in place   Incision C/D/I closed with dermabond. Some serosanguinous oozing at center of incision.  Ostomy pink and viable with no gas in bag  Ext:  Warm and well-perfused    BMP  Recent Labs   Lab 03/10/23  0654 03/08/23  1430    142   POTASSIUM 4.4 4.1   CHLORIDE 106 105   CO2 24 28   BUN 22.3* 16.9   CR 1.18* 0.84   * 90     CBC  Recent Labs   Lab 03/10/23  0654 03/08/23  1430   WBC  --  3.4*   HGB 11.3* 12.9   HCT  --  40.3   PLT  --  146*         ASSESSMENT: This is a 57 year old female POD #1 s/p open excision of pelvic floor recurrence, excision of pelvic nodules, pelvic LN dissection, DOT-BSO and cystoscopy with stent placement.      Neuro/Pain: Continue tylenol, toradol, PCA, and robaxin  CV: JENNIFER, CTM  PULM: encourage IS. Wean O2 as able.  GI/FEN: Advance to CLD  : continue penn while urine remains blood-tinged. Reduce IVF to 75/hr  Heme: Hgb 11.3 from 12.8 preop.   ID: No acute concerns. Continue POD  #1 dose of ertapenem  Endocrine: continue synthroid  Other: None  Lines: PIV, Yoon  Activity: as tolerated.  Ppx: Lovenox while ipatient and for 30 days on discharge  Dispo: 7C, pending pain control, tolerating diet, ostomy output      Mich Lima MD  Intern, General Surgery  Colorectal Intern     Patient seen with Dr. Oquendo who discussed with Dr. Gomez, who agrees with the plan

## 2023-03-10 NOTE — PLAN OF CARE
"Goal Outcome Evaluation:  POD 0  VS: /65 (BP Location: Right arm, Cuff Size: Adult Large)   Pulse 50   Temp 98.3  F (36.8  C)   Resp 16   Ht 1.854 m (6' 1\")   Wt (!) 151.7 kg (334 lb 7 oz)   SpO2 93%   BMI 44.12 kg/m     Neuro: A&O X4, able to make needs known  Cardiac: Bradycardic in the 40s and 50s and pt said that's her basesline. Provider aware.              Respiratory: On 2L of O2, no report of SOB.  GI/: Yoon in place with bright red/bloody output. Provider aware and said it is to be expected. BS hypoactive, no gas or BM yet  Diet/appetite: NPO except for meds and ice chips. No report of nausea  Activity: Stood at side of the bed, march in place  Pain: Abdominal pain managed with PCA dilaudid, tylenol and toradol.  Skin/drains: WDL except for colostomy and midline incision.  Lines: Bilateral PIV, left PIV saline locked and R is infusing LR @ 125ml/hr.  Notify provider if blood clot is noted in urine. Continue to monitor for ROBF and continue with POC.         "

## 2023-03-10 NOTE — PHARMACY-ADMISSION MEDICATION HISTORY
Admission Medication History Completed by Pharmacy    See [facility] Admission Navigator for allergy information, preferred outpatient pharmacy, prior to admission medications and immunization status.     Medication History Sources:     Patient reported last dose to RN, confirmed via dispense history and chart review    Changes made to PTA medication list (reason):    Added: None    Deleted: None    Changed: None    Additional Information:    None    Prior to Admission medications    Medication Sig Last Dose Taking? Auth Provider Long Term End Date   levothyroxine (SYNTHROID/LEVOTHROID) 150 MCG tablet Take 1 tablet by mouth every morning 3/9/2023 at 0515 Yes Reported, Patient Yes        Date completed: 03/10/23    Medication history completed by: Deyvi Garcia formerly [facility]

## 2023-03-11 ENCOUNTER — APPOINTMENT (OUTPATIENT)
Dept: PHYSICAL THERAPY | Facility: CLINIC | Age: 58
End: 2023-03-11
Attending: UROLOGY
Payer: COMMERCIAL

## 2023-03-11 ENCOUNTER — APPOINTMENT (OUTPATIENT)
Dept: ULTRASOUND IMAGING | Facility: CLINIC | Age: 58
End: 2023-03-11
Attending: COLON & RECTAL SURGERY
Payer: COMMERCIAL

## 2023-03-11 LAB
ANION GAP SERPL CALCULATED.3IONS-SCNC: 10 MMOL/L (ref 7–15)
ANION GAP SERPL CALCULATED.3IONS-SCNC: 8 MMOL/L (ref 7–15)
BUN SERPL-MCNC: 22 MG/DL (ref 6–20)
BUN SERPL-MCNC: 22.9 MG/DL (ref 6–20)
CALCIUM SERPL-MCNC: 7.8 MG/DL (ref 8.6–10)
CALCIUM SERPL-MCNC: 8.5 MG/DL (ref 8.6–10)
CHLORIDE SERPL-SCNC: 106 MMOL/L (ref 98–107)
CHLORIDE SERPL-SCNC: 106 MMOL/L (ref 98–107)
CREAT SERPL-MCNC: 1.5 MG/DL (ref 0.51–0.95)
CREAT SERPL-MCNC: 1.5 MG/DL (ref 0.51–0.95)
DEPRECATED HCO3 PLAS-SCNC: 23 MMOL/L (ref 22–29)
DEPRECATED HCO3 PLAS-SCNC: 26 MMOL/L (ref 22–29)
GFR SERPL CREATININE-BSD FRML MDRD: 40 ML/MIN/1.73M2
GFR SERPL CREATININE-BSD FRML MDRD: 40 ML/MIN/1.73M2
GLUCOSE SERPL-MCNC: 103 MG/DL (ref 70–99)
GLUCOSE SERPL-MCNC: 98 MG/DL (ref 70–99)
HGB BLD-MCNC: 9.3 G/DL (ref 11.7–15.7)
HGB BLD-MCNC: 9.3 G/DL (ref 11.7–15.7)
MAGNESIUM SERPL-MCNC: 1.9 MG/DL (ref 1.7–2.3)
PHOSPHATE SERPL-MCNC: 2.5 MG/DL (ref 2.5–4.5)
POTASSIUM SERPL-SCNC: 3.9 MMOL/L (ref 3.4–5.3)
POTASSIUM SERPL-SCNC: 4 MMOL/L (ref 3.4–5.3)
SODIUM SERPL-SCNC: 139 MMOL/L (ref 136–145)
SODIUM SERPL-SCNC: 140 MMOL/L (ref 136–145)

## 2023-03-11 PROCEDURE — 97530 THERAPEUTIC ACTIVITIES: CPT | Mod: GP

## 2023-03-11 PROCEDURE — 97116 GAIT TRAINING THERAPY: CPT | Mod: GP

## 2023-03-11 PROCEDURE — 76770 US EXAM ABDO BACK WALL COMP: CPT

## 2023-03-11 PROCEDURE — 250N000013 HC RX MED GY IP 250 OP 250 PS 637: Performed by: COLON & RECTAL SURGERY

## 2023-03-11 PROCEDURE — 76770 US EXAM ABDO BACK WALL COMP: CPT | Mod: 26 | Performed by: RADIOLOGY

## 2023-03-11 PROCEDURE — 99253 IP/OBS CNSLTJ NEW/EST LOW 45: CPT | Performed by: UROLOGY

## 2023-03-11 PROCEDURE — 258N000003 HC RX IP 258 OP 636: Performed by: COLON & RECTAL SURGERY

## 2023-03-11 PROCEDURE — 83735 ASSAY OF MAGNESIUM: CPT | Performed by: COLON & RECTAL SURGERY

## 2023-03-11 PROCEDURE — 120N000002 HC R&B MED SURG/OB UMMC

## 2023-03-11 PROCEDURE — 85018 HEMOGLOBIN: CPT | Performed by: COLON & RECTAL SURGERY

## 2023-03-11 PROCEDURE — 258N000003 HC RX IP 258 OP 636: Performed by: STUDENT IN AN ORGANIZED HEALTH CARE EDUCATION/TRAINING PROGRAM

## 2023-03-11 PROCEDURE — 36415 COLL VENOUS BLD VENIPUNCTURE: CPT | Performed by: COLON & RECTAL SURGERY

## 2023-03-11 PROCEDURE — 82310 ASSAY OF CALCIUM: CPT | Performed by: COLON & RECTAL SURGERY

## 2023-03-11 PROCEDURE — 250N000011 HC RX IP 250 OP 636: Performed by: COLON & RECTAL SURGERY

## 2023-03-11 PROCEDURE — 84100 ASSAY OF PHOSPHORUS: CPT | Performed by: COLON & RECTAL SURGERY

## 2023-03-11 RX ORDER — ENOXAPARIN SODIUM 100 MG/ML
40 INJECTION SUBCUTANEOUS EVERY 24 HOURS
Status: DISCONTINUED | OUTPATIENT
Start: 2023-03-11 | End: 2023-03-13

## 2023-03-11 RX ORDER — FUROSEMIDE 10 MG/ML
20 INJECTION INTRAMUSCULAR; INTRAVENOUS ONCE
Status: COMPLETED | OUTPATIENT
Start: 2023-03-11 | End: 2023-03-11

## 2023-03-11 RX ORDER — ENOXAPARIN SODIUM 100 MG/ML
40 INJECTION SUBCUTANEOUS EVERY 24 HOURS
Status: DISCONTINUED | OUTPATIENT
Start: 2023-03-12 | End: 2023-03-11

## 2023-03-11 RX ORDER — CALCIUM GLUCONATE 20 MG/ML
2 INJECTION, SOLUTION INTRAVENOUS ONCE
Status: COMPLETED | OUTPATIENT
Start: 2023-03-11 | End: 2023-03-11

## 2023-03-11 RX ADMIN — SODIUM CHLORIDE, POTASSIUM CHLORIDE, SODIUM LACTATE AND CALCIUM CHLORIDE 500 ML: 600; 310; 30; 20 INJECTION, SOLUTION INTRAVENOUS at 03:25

## 2023-03-11 RX ADMIN — SODIUM CHLORIDE, POTASSIUM CHLORIDE, SODIUM LACTATE AND CALCIUM CHLORIDE: 600; 310; 30; 20 INJECTION, SOLUTION INTRAVENOUS at 08:05

## 2023-03-11 RX ADMIN — FUROSEMIDE 20 MG: 10 INJECTION, SOLUTION INTRAVENOUS at 18:31

## 2023-03-11 RX ADMIN — ENOXAPARIN SODIUM 40 MG: 40 INJECTION SUBCUTANEOUS at 09:38

## 2023-03-11 RX ADMIN — ACETAMINOPHEN 1000 MG: 500 TABLET ORAL at 08:19

## 2023-03-11 RX ADMIN — ACETAMINOPHEN 1000 MG: 500 TABLET ORAL at 17:46

## 2023-03-11 RX ADMIN — CALCIUM GLUCONATE 2 G: 20 INJECTION, SOLUTION INTRAVENOUS at 10:48

## 2023-03-11 RX ADMIN — ACETAMINOPHEN 1000 MG: 500 TABLET ORAL at 12:39

## 2023-03-11 RX ADMIN — ACETAMINOPHEN 1000 MG: 500 TABLET ORAL at 21:10

## 2023-03-11 RX ADMIN — KETOROLAC TROMETHAMINE 15 MG: 15 INJECTION, SOLUTION INTRAMUSCULAR; INTRAVENOUS at 06:26

## 2023-03-11 RX ADMIN — LEVOTHYROXINE SODIUM 150 MCG: 0.15 TABLET ORAL at 08:05

## 2023-03-11 ASSESSMENT — ACTIVITIES OF DAILY LIVING (ADL)
ADLS_ACUITY_SCORE: 24
ADLS_ACUITY_SCORE: 24
ADLS_ACUITY_SCORE: 26
ADLS_ACUITY_SCORE: 24
ADLS_ACUITY_SCORE: 26
ADLS_ACUITY_SCORE: 24
ADLS_ACUITY_SCORE: 26
ADLS_ACUITY_SCORE: 26
ADLS_ACUITY_SCORE: 24
ADLS_ACUITY_SCORE: 26

## 2023-03-11 NOTE — PROGRESS NOTES
Colorectal Surgery Progress Note  [facility]  POD#2      Subjective:  Lasix yesterday with good response but low UOP since then, bolused 500cc overnight and urine output still low and bloody. Otherwise patient doing okay, having some hiccups but no n/v. Tolerated clears. Still on 3L NC. No stoma output    Vitals:  Vitals:    03/10/23 2149 03/11/23 0204 03/11/23 0646 03/11/23 0746   BP: 107/45 94/49 (!) 111/32 96/51   BP Location: Left arm Right arm Right arm Right arm   Cuff Size:       Pulse:  51 55 56   Resp:  19 18    Temp:  97  F (36.1  C) 98  F (36.7  C)    TempSrc:  Temporal Temporal    SpO2:  95% 97% 96%   Weight:       Height:         I/O:  I/O last 3 completed shifts:  In: 2732 [P.O.:980; I.V.:1752]  Out: 1105 [Urine:1105]    Physical Exam:  Gen: AAOx3, NAD  Pulm: Non-labored breathing on 3LNC  Abd: Soft, non-distended, appropriately tender, no guarding. Abdominal binder in place   Incision C/D/I closed with dermabond.  Ostomy pink and viable with no gas in bag  Ext:  Edematous, warm and well-perfused    BMP  Recent Labs   Lab 03/11/23  0711 03/10/23  2232 03/10/23  1324 03/10/23  0654 03/08/23  1430     --   --  140 142   POTASSIUM 4.0  --   --  4.4 4.1   CHLORIDE 106  --   --  106 105   CO2 23  --   --  24 28   BUN 22.9*  --   --  22.3* 16.9   CR 1.50*  --   --  1.18* 0.84   GLC 98  --   --  127* 90   MAG 1.9  --  1.7  --   --    PHOS 2.5 2.6  --   --   --      CBC  Recent Labs   Lab 03/11/23  0711 03/10/23  0654 03/08/23  1430   WBC  --   --  3.4*   HGB 9.3* 11.3* 12.9   HCT  --   --  40.3   PLT  --   --  146*         ASSESSMENT: This is a 57 year old female POD #2 s/p open excision of pelvic floor recurrence, excision of pelvic nodules, pelvic LN dissection, DOT-BSO and cystoscopy with stent placement.      Neuro/Pain: Continue tylenol, toradol, PCA, and robaxin  CV: JENNIFER, CTM  PULM: encourage IS. Wean O2 as able.  GI/FEN: NPO for possible procedure today  :  continue penn while urine remains blood-tinged. Getting stat renal US to eval for hydro and urology consult for stent placement.  Heme: Hgb 9.3 from 11.3, decreased lovenox from BID to daily  ID: No acute concerns.  Endocrine: continue synthroid  Other: None  Lines: PIV, Penn  Activity: as tolerated.  Ppx: Lovenox while inpatient and for 30 days on discharge  Dispo: 7C, pending pain control, tolerating diet, ostomy output    Discussed with Dr. Patricia Batres MD  Fellow in Colon and Rectal Surgery  [facility]   Pager [phone]

## 2023-03-11 NOTE — PLAN OF CARE
Pt is A/Ox4, on continuous SpO2 monitoring, 3L NC. Soft BP's and bradycardic. Provider notified of low urine output and soft BPs; 500mL bolus given. Penn in place, urine is red - provider aware. Pain controlled with PCA pump, tylenol, and toradol. Denies nausea. Colostomy in place. Not OOB due to concerns of orthostatic hypotension. On a CLD. PIVx2 with LR @ 75mL and PCA dilaudid.    Provider paged at 0630 - low urine output from penn, red in color, 75mL since 0230.

## 2023-03-11 NOTE — PROGRESS NOTES
"   03/10/23 1053   Appointment Info   Signing Clinician's Name / Credentials (PT) Leatha Rodriguez, PT   Living Environment   People in Home child(mario), adult;spouse   Current Living Arrangements house   Home Accessibility stairs to enter home;stairs within home   Number of Stairs, Main Entrance 3   Stair Railings, Main Entrance none   Number of Stairs, Within Home, Primary greater than 10 stairs  (12)   Stair Railings, Within Home, Primary none   Transportation Anticipated family or friend will provide   Self-Care   Usual Activity Tolerance good   Current Activity Tolerance fair   Regular Exercise Yes   Activity/Exercise Type walking   Exercise Amount/Frequency 3-5 times/wk   Equipment Currently Used at Home other (see comments)  (adjustable bed)   Fall history within last six months yes   Number of times patient has fallen within last six months 2   Activity/Exercise/Self-Care Comment Pt independent with mobility without device prior to admit; walks daily when weather permits   General Information   Onset of Illness/Injury or Date of Surgery 03/09/23   Referring Physician Long Gomez MD   Patient/Family Therapy Goals Statement (PT) Return home   Pertinent History of Current Problem (include personal factors and/or comorbidities that impact the POC) Per chart review, \"57 year old female s/p open excision of pelvic floor for recurrence of rectal adenocarcinoma, excision of pelvic nodules, pelvic LN dissection, DOT-BSO and cystoscopy with stent placement.\" Other PMHx: hypothyroidism, h/o PE, morbid obesity   Existing Precautions/Restrictions abdominal   General Observations Activity: up with assist; abd binder for comfort   Cognition   Affect/Mental Status (Cognition) WNL   Orientation Status (Cognition) oriented x 4   Follows Commands (Cognition) WNL   Pain Assessment   Patient Currently in Pain Yes, see Vital Sign flowsheet   Posture    Posture Forward head position;Protracted shoulders   Range of Motion (ROM) "   ROM Comment bilateral L/Es WFL for mobility   Strength (Manual Muscle Testing)   Strength Comments bilateral L/Es WFL for mobility   Bed Mobility   Comment, (Bed Mobility) CGA with cues, plus verbal instruction for supine to left sidelying to sitting with HOB elevated (has adjustable bed at home); assist x 2 for sit to left sidelying to supine d/t hypotensive episode & pt reporting that she may pass out (see vitals flowsheet for orthostatics)   Transfers   Comment, (Transfers) CGA for sit to stand from EOB with use of WW; able to stand with WW & SBA x 2 min to obtain standing BP (orthostatic - see vitals flowsheet)   Gait/Stairs (Locomotion)   Comment, (Gait/Stairs) unable to progress gait due to orthostatic hypotension with significant lightheadedness   Balance   Balance Comments sitting - good at EOB without U/E support; standing - good/fair with bilateral U/E support on WW   Sensory Examination   Sensory Perception patient reports no sensory changes   Clinical Impression   Criteria for Skilled Therapeutic Intervention Yes, treatment indicated   PT Diagnosis (PT) impaired mobility   Influenced by the following impairments pain, post-op abd precautions; orthostatic hypotension   Functional limitations due to impairments decreased independence with bed mobility, transfers & gait   Clinical Presentation (PT Evaluation Complexity) Evolving/Changing   Clinical Presentation Rationale based upon subjective information provided, objective exam findings, medical history & clinical judgement   Clinical Decision Making (Complexity) low complexity   Planned Therapy Interventions (PT) bed mobility training;gait training;stair training;transfer training   Risk & Benefits of therapy have been explained evaluation/treatment results reviewed;care plan/treatment goals reviewed;participants voiced agreement with care plan;participants included;patient;daughter   PT Total Evaluation Time   PT Eval, Low Complexity Minutes (24570) 10    Physical Therapy Goals   PT Frequency Daily   PT Predicted Duration/Target Date for Goal Attainment 03/15/23   PT Goals Bed Mobility;Transfers;Gait;Stairs  (with use of adjustable features on bed)   PT: Bed Mobility Modified independent;Supine to/from sit;Rolling;Within precautions   PT: Transfers Independent;Sit to/from stand;Bed to/from chair;Assistive device;Within precautions   PT: Gait Modified independent;Rolling walker;Greater than 200 feet   PT: Stairs Supervision/stand-by assist;Greater than 10 stairs   PT Discharge Planning   PT Discharge Recommendation (DC Rec) home with assist   PT Rationale for DC Rec Pt with orthostatic hypotension with standing this date thus unable to progress gait; was able to transfer with CGA & anticipate with continued skilled PT intervention that pt will progress to mod independent mobility with WW for safe return home with assist from family prn   PT Brief overview of current status SBA & verbal cues for supine to/from sit with HOB elevated; CGA for sit to stand with WW   Total Session Time   Total Session Time (sum of timed and untimed services) 10

## 2023-03-11 NOTE — PLAN OF CARE
Goal Outcome Evaluation:  POD #1. A&Ox4. 3.5L NC. Soft BPs - provider ware. Bradycardic - provider aware. Continuous capno. CLD - tolerating. Assist of 2 - Pt has orthostatic hypotension. Denies nausea. Pain controlled w/ dilaudid PCA, tylenol, Toradol. Yoon in place, clear red/pink output - team aware, urine has become lighter color throughout day, adequate output. Colostomy - pink, no gas, no bm. Midline incision - EDITH, ABD in place, scant drainage. Abdominal binder in place. Ca+ 7.9 - provider paged.  Albumin and Lasix given today.

## 2023-03-11 NOTE — PROGRESS NOTES
1420  Brig[place] [id number]  7C [id number] ADELINA.LOUISA UNDERWOODI - today's hgb 9.3, yesterday's hgb 11.3   Thank you!    provider called back   No interventions given

## 2023-03-11 NOTE — CONSULTS
Urology Consult    Name: Sarah Portillo    MRN: [id number]   YOB: 1965               Chief Complaint:   Oligouria and hematuria s/p colorectal stent removal     History is obtained from the patient and chart review          History of Present Illness:   Sarah Portillo is a 57 year old female POD#2 s/p bilateral ureteral stent placement, open excision of pelvic floor recurrence and excision of pelvic nodules, right PLND, and omental pedicle flap. Intra-operatively urology placed bilateral ureteral stents and were called intra-operatively to inspect the right ureter which had been mobilized with some mild cautery artifact to the distal 1/3rd of the ureter. At that time it was felt no further intervention was needed. The catheter were removed at the end of the case. She was initially making adequate urine, but on POD#2 her urine output began to decrease to 75ml over 6 hours. Additionally, her creatinine had increased from 0.8 pre-op to 1.18 and then 1.5 on POD#2. Due to the hematuria, low urine output and concern for cautery artifact on the right ureter, urology was consulted. Of note, the patient was on scheduled Toradol post-op which was discontinued today.     Saw the patient at the bedside, she states that she feels empty and has no flank pain. Yoon catheter canister with scant cherry colored urine (just emptied), but ~20cc of light pink urine within the tubing.           Past Medical History:     Past Medical History:   Diagnosis Date     Colon cancer (H)      History of pulmonary embolism 12/15/2020    Provoked by chemo and immobility. Was on warfarin x 1 year.     Hypothyroidism      Obesity             Past Surgical History:     Past Surgical History:   Procedure Laterality Date     COMBINED CYSTOSCOPY, INSERT STENT URETER(S) Bilateral 3/9/2023    Procedure: CYSTOSCOPY, WITH URETERAL STENT INSERTION Bilateral;  Surgeon: Catracho Connolly MD;  Location: [facility] OR     HYSTERECTOMY TOTAL  "ABDOMINAL, BILATERAL SALPINGO-OOPHORECTOMY, COMBINED N/A 3/9/2023    Procedure: open removal of uterus, cervix, and both ovaries and fallopian tubes;  Surgeon: Bernadette Niño MD;  Location: [facility] OR     LAPAROTOMY, TUMOR DEBULKING, COMBINED N/A 3/9/2023    Procedure: LAPAROTOMY, OPEN EXCISION OF PELVIC FLOOR RECURRENCE, EXCISION OF PELVIC NODULES, RIGHT PELVIC LYMPH NODE DISSECTION, OMENTAL PEDICAL FLAP;  Surgeon: Long Gomez MD;  Location: [facility] OR     LOW ANTERIOR BOWEL RESECTION  08/09/2021    In [place]     SALPINGECTOMY      1998 d/t tubal pregnancy            Social History:     Social History     Tobacco Use     Smoking status: Never     Smokeless tobacco: Never   Substance Use Topics     Alcohol use: Never            Family History:     Family History   Problem Relation Age of Onset     Venous thrombosis Nephew      Anesthesia Reaction No family hx of      Bleeding Disorder No family hx of             Allergies:   No Known Allergies         Medications:     Current Facility-Administered Medications   Medication     acetaminophen (TYLENOL) tablet 1,000 mg     benzocaine-menthol (CHLORASEPTIC MAX) 15-10 MG lozenge 1 lozenge     bupivacaine liposome (EXPAREL) LA inj was given in the infiltration site to produce post-op analgesia. Duration of action is up to 72 hours. Other \"renee\" meds should not be given for 96 hours except for lidocaine 4% patch. This is for INFORMATION ONLY.     diphenhydrAMINE (BENADRYL) injection 25 mg     enoxaparin ANTICOAGULANT (LOVENOX) injection 40 mg     hydrALAZINE (APRESOLINE) injection 10 mg     HYDROmorphone (DILAUDID) PCA 0.2 mg/mL OPIOID NAIVE (age less than 65 years)     lactated ringers infusion     levothyroxine (SYNTHROID/LEVOTHROID) tablet 150 mcg     lidocaine (LMX4) cream     lidocaine 1 % 0.1-1 mL     LORazepam (ATIVAN) injection 0.5 mg     methocarbamol (ROBAXIN) tablet 500 mg     naloxone (NARCAN) injection 0.2 mg    Or     naloxone (NARCAN) injection " 0.4 mg    Or     naloxone (NARCAN) injection 0.2 mg    Or     naloxone (NARCAN) injection 0.4 mg     prochlorperazine (COMPAZINE) injection 10 mg     sodium chloride (PF) 0.9% PF flush 3 mL     sodium chloride (PF) 0.9% PF flush 3 mL             Review of Systems:    ROS: 10 point ROS neg other than the symptoms noted above in the HPI           Physical Exam:   VS:  T: 98    HR: 56    BP: 96/51    RR: 18   GEN:  AOx3.  NAD.    CV:  RRR  LUNGS: Non-labored breathing.   BACK:  No midline or CVA tenderness.  ABD:  Soft.  NT.  ND.  No rebound or guarding.  No masses.  EXT:  Warm, well perfused. .  SKIN:  Warm.  Dry.  No rashes.  NEURO:  CN grossly intact.            Data:   All laboratory data reviewed:    Recent Labs   Lab 03/11/23  0711 03/10/23  0654 03/08/23  1430   WBC  --   --  3.4*   HGB 9.3* 11.3* 12.9   PLT  --   --  146*     Recent Labs   Lab 03/11/23  0711 03/10/23  2232 03/10/23  1324 03/10/23  0654 03/08/23  1430     --   --  140 142   POTASSIUM 4.0  --   --  4.4 4.1   CHLORIDE 106  --   --  106 105   CO2 23  --   --  24 28   BUN 22.9*  --   --  22.3* 16.9   CR 1.50*  --   --  1.18* 0.84   GLC 98  --   --  127* 90   IVORY 7.8*  --   --  7.9* 9.4   MAG 1.9  --  1.7  --   --    PHOS 2.5 2.6  --   --   --      No lab results found in last 7 days.    Invalid input(s): URINEBLOOD    All pertinent imaging reviewed:           Impression and Plan:   Impression:   Sarah Portillo is a 57 year old female POD#2 s/p bilateral ureteral stent placement, open excision of pelvic floor recurrence and excision of pelvic nodules, right PLND, and omental pedicle flap c/b possible distal right ureteral cautery artifact, now with decreased urine output, persistent hematuria, and up trending creatinine. The patient's penn catheter actually had more urine within the tubing so she is producing more urine that previously charged. We irrigated the catheter at beside and there was no evidence of clots as it irrigated easily.  However, given concern for possible intra-op ureteral injury should be watched closely. Agree with stat FAREED, urology will follow. If abnormal will plan to obtain a CT. If FAREED WNL, will plan for PM BMP check (CRS already ordered). If creatinine continues to increase on that BMP will plan for CT. If creatinine stable or down-trending will continue to monitor. For now agree with holding nephrotoxic agents and fluid hydration.       Plan:     - Urology to f/u FAREED; if abnormal will order CTAP, if WNL will f/u PM BMP  - If PM BMP with elevated creatinine will order CTAP, if PM BMP with stable/decreased creatinine will continue to monitor     - continue to hold nephrotoxic agents  - agree with fluid hydration    - okay to irrigate catheter PRN if concern for clot retention     - Urology will continue to follow. Please contact resident/PA on call with any questions or concerns.       This patient's exam findings, labs, and imaging discussed with urology staff surgeon Dr. Ray, who developed the treatment plan.    Rivas Platt MD  Urology Resident

## 2023-03-11 NOTE — PROGRESS NOTES
Renal US shows no evidence of hydronephrosis. Will f/u PM BMP and keep patient NPO @ mn. Discussed with Dr Batres.     Jamel Rodney MD  PGY1 Surgery

## 2023-03-12 LAB
ANION GAP SERPL CALCULATED.3IONS-SCNC: 9 MMOL/L (ref 7–15)
BUN SERPL-MCNC: 19.3 MG/DL (ref 6–20)
CALCIUM SERPL-MCNC: 8.3 MG/DL (ref 8.6–10)
CHLORIDE SERPL-SCNC: 105 MMOL/L (ref 98–107)
CREAT SERPL-MCNC: 1.38 MG/DL (ref 0.51–0.95)
DEPRECATED HCO3 PLAS-SCNC: 25 MMOL/L (ref 22–29)
GFR SERPL CREATININE-BSD FRML MDRD: 44 ML/MIN/1.73M2
GLUCOSE SERPL-MCNC: 90 MG/DL (ref 70–99)
HGB BLD-MCNC: 9.2 G/DL (ref 11.7–15.7)
MAGNESIUM SERPL-MCNC: 1.8 MG/DL (ref 1.7–2.3)
PHOSPHATE SERPL-MCNC: 3 MG/DL (ref 2.5–4.5)
POTASSIUM SERPL-SCNC: 3.7 MMOL/L (ref 3.4–5.3)
SODIUM SERPL-SCNC: 139 MMOL/L (ref 136–145)

## 2023-03-12 PROCEDURE — 250N000013 HC RX MED GY IP 250 OP 250 PS 637: Performed by: COLON & RECTAL SURGERY

## 2023-03-12 PROCEDURE — 82310 ASSAY OF CALCIUM: CPT | Performed by: COLON & RECTAL SURGERY

## 2023-03-12 PROCEDURE — 84100 ASSAY OF PHOSPHORUS: CPT | Performed by: COLON & RECTAL SURGERY

## 2023-03-12 PROCEDURE — 120N000002 HC R&B MED SURG/OB UMMC

## 2023-03-12 PROCEDURE — 36415 COLL VENOUS BLD VENIPUNCTURE: CPT | Performed by: COLON & RECTAL SURGERY

## 2023-03-12 PROCEDURE — 258N000003 HC RX IP 258 OP 636: Performed by: COLON & RECTAL SURGERY

## 2023-03-12 PROCEDURE — 85018 HEMOGLOBIN: CPT | Performed by: COLON & RECTAL SURGERY

## 2023-03-12 PROCEDURE — 250N000011 HC RX IP 250 OP 636: Performed by: COLON & RECTAL SURGERY

## 2023-03-12 PROCEDURE — 83735 ASSAY OF MAGNESIUM: CPT | Performed by: COLON & RECTAL SURGERY

## 2023-03-12 RX ORDER — HYDROMORPHONE HCL IN WATER/PF 6 MG/30 ML
.2-.4 PATIENT CONTROLLED ANALGESIA SYRINGE INTRAVENOUS
Status: DISCONTINUED | OUTPATIENT
Start: 2023-03-12 | End: 2023-03-14 | Stop reason: HOSPADM

## 2023-03-12 RX ORDER — OXYCODONE HYDROCHLORIDE 5 MG/1
5-10 TABLET ORAL
Status: DISCONTINUED | OUTPATIENT
Start: 2023-03-12 | End: 2023-03-14 | Stop reason: HOSPADM

## 2023-03-12 RX ORDER — FUROSEMIDE 10 MG/ML
20 INJECTION INTRAMUSCULAR; INTRAVENOUS
Status: COMPLETED | OUTPATIENT
Start: 2023-03-12 | End: 2023-03-12

## 2023-03-12 RX ORDER — METOCLOPRAMIDE HYDROCHLORIDE 5 MG/ML
5 INJECTION INTRAMUSCULAR; INTRAVENOUS ONCE
Status: COMPLETED | OUTPATIENT
Start: 2023-03-12 | End: 2023-03-12

## 2023-03-12 RX ORDER — FUROSEMIDE 10 MG/ML
20 INJECTION INTRAMUSCULAR; INTRAVENOUS ONCE
Status: DISCONTINUED | OUTPATIENT
Start: 2023-03-12 | End: 2023-03-12

## 2023-03-12 RX ADMIN — ACETAMINOPHEN 1000 MG: 500 TABLET ORAL at 08:58

## 2023-03-12 RX ADMIN — FUROSEMIDE 20 MG: 10 INJECTION, SOLUTION INTRAVENOUS at 16:22

## 2023-03-12 RX ADMIN — ACETAMINOPHEN 1000 MG: 500 TABLET ORAL at 17:28

## 2023-03-12 RX ADMIN — SODIUM CHLORIDE, POTASSIUM CHLORIDE, SODIUM LACTATE AND CALCIUM CHLORIDE: 600; 310; 30; 20 INJECTION, SOLUTION INTRAVENOUS at 16:31

## 2023-03-12 RX ADMIN — ACETAMINOPHEN 1000 MG: 500 TABLET ORAL at 21:47

## 2023-03-12 RX ADMIN — METOCLOPRAMIDE 5 MG: 5 INJECTION, SOLUTION INTRAMUSCULAR; INTRAVENOUS at 10:29

## 2023-03-12 RX ADMIN — ENOXAPARIN SODIUM 40 MG: 40 INJECTION SUBCUTANEOUS at 09:35

## 2023-03-12 RX ADMIN — ACETAMINOPHEN 1000 MG: 500 TABLET ORAL at 13:11

## 2023-03-12 RX ADMIN — LEVOTHYROXINE SODIUM 150 MCG: 0.15 TABLET ORAL at 08:58

## 2023-03-12 ASSESSMENT — ACTIVITIES OF DAILY LIVING (ADL)
ADLS_ACUITY_SCORE: 26

## 2023-03-12 NOTE — PROGRESS NOTES
"Urology  Progress Note    JEFFERYO  Received 20mg of lasix x1  Creatinine improving   Urine output improving   Penn in place     Exam  /55 (BP Location: Right arm)   Pulse 61   Temp 97.1  F (36.2  C) (Temporal)   Resp 16   Ht 1.854 m (6' 1\")   Wt (!) 151.7 kg (334 lb 7 oz)   SpO2 96%   BMI 44.12 kg/m    No acute distress  Unlabored breathing  Abdomen soft,  appropriately tender, nondistended.  Penn with clear watermelon colored urine in tubing      UOP 1100/200    Labs  Recent Labs   Lab Test 03/12/23  0522 03/11/23  1604 03/11/23  0711 03/10/23  0654 03/08/23  1430   WBC  --   --   --   --  3.4*   HGB 9.2* 9.3* 9.3*   < > 12.9   CR 1.38* 1.50* 1.50*   < > 0.84    < > = values in this interval not displayed.        Assessment/Plan  57 year old y/o female POD#3 s/p s/p bilateral ureteral stent placement, open excision of pelvic floor recurrence and excision of pelvic nodules, right PLND, and omental pedicle flap with right ureteral mobilization and subsequent decreased urine output, hematuria, and up trending creatinine that are now all improving. Urine output and creatinine improving.     - primary cares per colorectal surgery  - agree with hydration  - okay to irrigate catheter PRN if concern for clot retention  - okay to remove penn catheter later today if ambulating and still able to collect strict I/Os  - Urology will continue to follow peripherally now that UOP and creatinine improving; please reach out with any questions or concerns, please notify if hematuria worsening     Seen and examined with the chief resident and staff Dr. Cory Platt MD  Urology Resident     Contacting the Urology Team     Please use the following job codes to reach the Urology Team. Note that you must use an in house phone and that job codes cannot receive text pages.   On weekdays, dial [phone] (or star-star-star [phone] on the new [employer] telephones) then [phone] to reach the Adult Urology resident or PA on call  On " weekdays, dial [phone] (or star-star-star [phone] on the new [employer] telephones) then [phone] to reach the Pediatric Urology resident  On weeknights and weekends, dial [phone] (or star-star-star [phone] on the new [employer] telephones) then [phone] to reach the Urology resident on call (for both Adult and Pediatrics)

## 2023-03-12 NOTE — PLAN OF CARE
Pt is A/Ox4. On 3L NC, other VSS.  Assist 1 w/GB and walker. Pain controlled with routine tylenol. On PCA dilaudid but did not push button overnight. Denies nausea. NPO beginning at midnight except ice/meds. Adequate urine output through penn; urine red in color. Has colostomy. Midline incision EDITH, no drainage. LR @ 75mL. Sleeping between cares.

## 2023-03-12 NOTE — PLAN OF CARE
Goal Outcome Evaluation:  POD #3. A&Ox4. 2L NC. FLD - tolerating.  Assist of 1 w/ walker. Denies nausea. Pain controlled w/ tylenol. Yoon removed around 1400, pt voiding spontaneously - light pink color and adequate output. Colostomy - pink, passing gas, no bm,  Midline incision - EDITH, abdominal binder in place. Ca+ 8.3. LR running at 10 ml/hr. Pt ambulated x3. PCA discontinued.

## 2023-03-12 NOTE — PROVIDER NOTIFICATION
2021  Brigid AYALA [id number]   7415-2 ADELINA.JAVED.  Just want to make sure you're aware of Pt's labs: Ca+ - 8.3, Hgb 9.2, and Cr 1.38. Do you know when she will get CT? pt is wondering.   Provider paged

## 2023-03-12 NOTE — PLAN OF CARE
Goal Outcome Evaluation:  POD #2. A&Ox4. 3L NC. Alexandre - provider aware. Continuous pulse oximetry. CLD - tolerating. NPO at 0000. Assist of 1 w/ walker and GB. Denies nausea. Pain controlled w/ dilaudid PCA and tylenol. Yoon in place, clear red/pink output - team aware, low output- provider ware per note. Colostomy - pink, passing gas, no bm, bag changed today by pt. Midline incision - EDITH, abdominal binder in place. Ca+ - replaced. LR running at 75 ml/hr. Dilaudid PCA settings changed per MD order. Pt ambulated x2. Pt showered. Lasix given.

## 2023-03-12 NOTE — PROVIDER NOTIFICATION
0649   Brigid RN [id number]   4515-2 G.K   Pt has low urine output. since 0630 - total of 325ml out. Pt just received 20mg of lasix. Thank you.   Provider paged    Provider called back - no new given     Give lasix time to work - contract provider

## 2023-03-13 LAB
ANION GAP SERPL CALCULATED.3IONS-SCNC: 11 MMOL/L (ref 7–15)
BUN SERPL-MCNC: 15.3 MG/DL (ref 6–20)
CALCIUM SERPL-MCNC: 8.1 MG/DL (ref 8.6–10)
CHLORIDE SERPL-SCNC: 104 MMOL/L (ref 98–107)
CREAT SERPL-MCNC: 1.06 MG/DL (ref 0.51–0.95)
DEPRECATED HCO3 PLAS-SCNC: 27 MMOL/L (ref 22–29)
GFR SERPL CREATININE-BSD FRML MDRD: 61 ML/MIN/1.73M2
GLUCOSE SERPL-MCNC: 105 MG/DL (ref 70–99)
HGB BLD-MCNC: 9 G/DL (ref 11.7–15.7)
MAGNESIUM SERPL-MCNC: 1.9 MG/DL (ref 1.7–2.3)
PHOSPHATE SERPL-MCNC: 2.8 MG/DL (ref 2.5–4.5)
POTASSIUM SERPL-SCNC: 3.5 MMOL/L (ref 3.4–5.3)
SODIUM SERPL-SCNC: 142 MMOL/L (ref 136–145)

## 2023-03-13 PROCEDURE — 999N000202 HC STATISTICAL VASC ACCESS NURSE TIME, 1-15 MINUTES

## 2023-03-13 PROCEDURE — 120N000002 HC R&B MED SURG/OB UMMC

## 2023-03-13 PROCEDURE — 36415 COLL VENOUS BLD VENIPUNCTURE: CPT | Performed by: COLON & RECTAL SURGERY

## 2023-03-13 PROCEDURE — 250N000013 HC RX MED GY IP 250 OP 250 PS 637: Performed by: COLON & RECTAL SURGERY

## 2023-03-13 PROCEDURE — 250N000011 HC RX IP 250 OP 636: Performed by: STUDENT IN AN ORGANIZED HEALTH CARE EDUCATION/TRAINING PROGRAM

## 2023-03-13 PROCEDURE — 84100 ASSAY OF PHOSPHORUS: CPT | Performed by: COLON & RECTAL SURGERY

## 2023-03-13 PROCEDURE — 85018 HEMOGLOBIN: CPT | Performed by: COLON & RECTAL SURGERY

## 2023-03-13 PROCEDURE — 82310 ASSAY OF CALCIUM: CPT | Performed by: COLON & RECTAL SURGERY

## 2023-03-13 PROCEDURE — 83735 ASSAY OF MAGNESIUM: CPT | Performed by: COLON & RECTAL SURGERY

## 2023-03-13 PROCEDURE — 250N000013 HC RX MED GY IP 250 OP 250 PS 637: Performed by: STUDENT IN AN ORGANIZED HEALTH CARE EDUCATION/TRAINING PROGRAM

## 2023-03-13 RX ORDER — POLYETHYLENE GLYCOL 3350 17 G/17G
17 POWDER, FOR SOLUTION ORAL DAILY
Qty: 510 G
Start: 2023-03-13 | End: 2023-03-14

## 2023-03-13 RX ORDER — ENOXAPARIN SODIUM 100 MG/ML
30 INJECTION SUBCUTANEOUS EVERY 12 HOURS
Qty: 20 ML | Refills: 0
Start: 2023-03-13 | End: 2023-03-14

## 2023-03-13 RX ORDER — METHOCARBAMOL 500 MG/1
500 TABLET, FILM COATED ORAL 3 TIMES DAILY PRN
Qty: 30 TABLET | Refills: 0
Start: 2023-03-13 | End: 2023-03-14

## 2023-03-13 RX ORDER — ENOXAPARIN SODIUM 100 MG/ML
30 INJECTION SUBCUTANEOUS EVERY 12 HOURS
Status: DISCONTINUED | OUTPATIENT
Start: 2023-03-13 | End: 2023-03-14 | Stop reason: HOSPADM

## 2023-03-13 RX ORDER — OXYCODONE HYDROCHLORIDE 5 MG/1
5-10 TABLET ORAL
Qty: 8 TABLET | Refills: 0
Start: 2023-03-13 | End: 2023-03-14

## 2023-03-13 RX ORDER — ACETAMINOPHEN 500 MG
1000 TABLET ORAL 4 TIMES DAILY
Start: 2023-03-13 | End: 2023-03-14

## 2023-03-13 RX ORDER — POLYETHYLENE GLYCOL 3350 17 G/17G
17 POWDER, FOR SOLUTION ORAL DAILY
Status: DISCONTINUED | OUTPATIENT
Start: 2023-03-13 | End: 2023-03-14 | Stop reason: HOSPADM

## 2023-03-13 RX ADMIN — ACETAMINOPHEN 1000 MG: 500 TABLET ORAL at 18:11

## 2023-03-13 RX ADMIN — METHOCARBAMOL 500 MG: 500 TABLET ORAL at 12:44

## 2023-03-13 RX ADMIN — LEVOTHYROXINE SODIUM 150 MCG: 0.15 TABLET ORAL at 07:53

## 2023-03-13 RX ADMIN — ACETAMINOPHEN 1000 MG: 500 TABLET ORAL at 09:48

## 2023-03-13 RX ADMIN — ENOXAPARIN SODIUM 30 MG: 30 INJECTION SUBCUTANEOUS at 09:51

## 2023-03-13 RX ADMIN — POLYETHYLENE GLYCOL 3350 17 G: 17 POWDER, FOR SOLUTION ORAL at 09:47

## 2023-03-13 RX ADMIN — ENOXAPARIN SODIUM 30 MG: 30 INJECTION SUBCUTANEOUS at 20:11

## 2023-03-13 ASSESSMENT — ACTIVITIES OF DAILY LIVING (ADL)
ADLS_ACUITY_SCORE: 21
ADLS_ACUITY_SCORE: 26
ADLS_ACUITY_SCORE: 21
ADLS_ACUITY_SCORE: 21
ADLS_ACUITY_SCORE: 26
ADLS_ACUITY_SCORE: 21
ADLS_ACUITY_SCORE: 26

## 2023-03-13 NOTE — PROGRESS NOTES
CLINICAL NUTRITION SERVICES    Received provider ordering consult for Low residue/low fiber diet teaching.    Met with patient at bedside.  Pt reports being familiar with low fiber diet as she has been on it before, pt politely declined need for diet education.  Let pt know of inpatient RD availability if questions arise.    RD will continue to follow per LOS protocol and/or if re-consulted.    Lidia Yeh RD, , LD  Weekday Pager: [phone]  Weekday Units covered: 7C (all beds) and 5A (beds 5201 through 5209)  Weekend/Holiday RD Pager: [phone]

## 2023-03-13 NOTE — PLAN OF CARE
Pt is A/Ox4, VSS on 2L NC. Low fiber diet, tolerating well. Denies nausea. Pain at 1/10, controlled with tylenol. Abdominal incision with no drainage. Voiding adequately. Urine is tinged red, some sediment, provider notified at 0445. Colostomy with gas, no BM, per pt report. Ambulating SBA, walked in the hallways. LR ordered at 10mL/hr through PIV. Resting between cares.

## 2023-03-13 NOTE — DISCHARGE INSTRUCTIONS
DIET  -Low Residue Diet for at least 4-6 weeks unless cleared by Colorectal surgery.  No raw vegetables, fruit skins, fibrous foods that require a lot of chewing, nuts, seeds, corn, popcorn.   -We recommend eating slowly, chewing thoroughly, eating small frequent meals throughout the day  -Stay well hydrated.      ACTIVITY  -No lifting, pushing, pulling greater than 10 lbs and no strenuous exercise for 6 weeks   -Do not insert anything into anus/rectum for 6 weeks  -No driving while on narcotic analgesics (i.e. Percocet, oxycodone, Vicodin)  -No driving until you are able to fully twist to both sides or slam on brakes quickly and without any pain  -We encourage walking at least 4-5 times per day    WOUND CARE  -Inspect your wounds daily for signs of infection (increased redness, drainage, pain)  -Keep your wound clean and dry  -You may shower, but do not soak in tub or pool    NOTIFY  Please contact Kenya Platt RN, Ines Hdez RN or Denise OROZCO at [phone] for problems after discharge such as:  -Temperature > 101F, chills, rigors, dizziness  -Redness around or purulent drainage from wound  -Inability to tolerate diet, nausea or vomiting  -You stop passing gas, develop significant bloating, abdominal pain  -Have blood in stools/vomit  -Have severe diarrhea/constipation  -Any other questions or concerns.  - At nights (after 4:30pm), on weekends, or if urgent, call [phone] and ask the  to speak with the on-call Colorectal Surgery resident or fellow      Medication Instructions  Some of your medications may have changed. Please take only prescribed and resumed medications     FOLLOW-UP  1.  You will need to follow-up with Jaclyn Torrez NP or Diane Garrison PA-C in the Colon and Rectal Surgery clinic in 1-2 week(s) and then with CRS Staff: Dr. Long Gomez in 4-5 weeks after.  Please contact our Nurses (phone # [phone]) if you have not heard from our clinic in 3 business days afer  discharge to schedule a follow-up appointment.    2.  Follow up with your primary care provider in 1-2 weeks after discharge from the hospital to review this hospitalization.

## 2023-03-13 NOTE — PROGRESS NOTES
4X A/O. Midline incision covered by tape, no signs of infection. Ostomy, permanent, pt initated cares, bag has gas but no BM.  Abdominal binder in place. IV left arm removed due to leaking, and LR discontinued, provider informed. Low fiber diet, tolerating small, frequent meals.  Ambulating with assist. Pain controled with Tylenol and Robaxin. Vascular come to reinstall IV access, pt refused.

## 2023-03-13 NOTE — PROGRESS NOTES
Colorectal Surgery Progress Note  [facility]  POD#4      Subjective:  Feeling better. Good response to Lasix IV. Good UOP since penn removal and less bloody. Renal US did not show hydro. Cr coming down 1.5->1.3->1.06. Tolerated FLD. Still on 2L NC. Passing gas per stoma but no stool.. ambulated 3x around cole    Vitals:  Vitals:    03/12/23 1600 03/12/23 1916 03/12/23 2219 03/13/23 0549   BP: 120/60 120/63 108/54 127/72   BP Location: Right arm Right arm Right arm Right arm   Pulse: 62 63 60 61   Resp:  18 18 18   Temp:  98  F (36.7  C) 99  F (37.2  C) 97.8  F (36.6  C)   TempSrc:  Temporal Temporal Temporal   SpO2:  96% 99% 97%   Weight:       Height:         I/O:  I/O last 3 completed shifts:  In: 1042.5 [P.O.:695; I.V.:347.5]  Out: 1585 [Urine:1585]    Physical Exam:  Gen: AAOx3, NAD  Pulm: Non-labored breathing on NC  Abd: Soft, non-distended, appropriately tender, no guarding. Abdominal binder in place   Incision C/D/I closed with dermabond.  Ostomy pink and viable with gas in bag  Ext:   warm and well-perfused    BMP  Recent Labs   Lab 03/12/23  0522 03/11/23  1604 03/11/23  0711 03/10/23  2232 03/10/23  1324 03/10/23  0654    140 139  --   --  140   POTASSIUM 3.7 3.9 4.0  --   --  4.4   CHLORIDE 105 106 106  --   --  106   CO2 25 26 23  --   --  24   BUN 19.3 22.0* 22.9*  --   --  22.3*   CR 1.38* 1.50* 1.50*  --   --  1.18*   GLC 90 103* 98  --   --  127*   MAG 1.8  --  1.9  --  1.7  --    PHOS 3.0  --  2.5 2.6  --   --      CBC  Recent Labs   Lab 03/12/23  0522 03/11/23  1604 03/11/23  0711 03/10/23  0654 03/08/23  1430   WBC  --   --   --   --  3.4*   HGB 9.2* 9.3* 9.3* 11.3* 12.9   HCT  --   --   --   --  40.3   PLT  --   --   --   --  146*         ASSESSMENT: This is a 57 year old female POD #4 s/p open excision of pelvic floor recurrence, excision of pelvic nodules, pelvic LN dissection, DOT-BSO and cystoscopy with ureteral stent placement.      Neuro/Pain: Continue  tylenol, Oxy, and robaxin. No Toradol.  CV: JENNIFER, CTM  PULM: encourage IS. Wean O2 as able.  GI/FEN: LR diet + suppl.  : Urinating spontaneously. No plan for lasix today. BMP today with K3.5, Cr downtranding  Heme: Hgb stable. Lovenox 30BID today.  ID: No acute concerns.  Endocrine: continue PTA synthroid  Other: None  Lines: PIV  Activity: ambulate QID.  Ppx: Lovenox QD.  Dispo: 7C, pending ostomy output    Seen and discussed with Dr. Oquendo, who discussed with staff, Dr. Patricia Lima MD  Intern, General Surgery  Colorectal Intern

## 2023-03-14 VITALS
RESPIRATION RATE: 16 BRPM | BODY MASS INDEX: 39.68 KG/M2 | SYSTOLIC BLOOD PRESSURE: 133 MMHG | OXYGEN SATURATION: 95 % | TEMPERATURE: 98.1 F | HEART RATE: 58 BPM | DIASTOLIC BLOOD PRESSURE: 68 MMHG | HEIGHT: 72 IN | WEIGHT: 293 LBS

## 2023-03-14 LAB
MAGNESIUM SERPL-MCNC: 1.8 MG/DL (ref 1.7–2.3)
PHOSPHATE SERPL-MCNC: 3.1 MG/DL (ref 2.5–4.5)
POTASSIUM SERPL-SCNC: 3.3 MMOL/L (ref 3.4–5.3)

## 2023-03-14 PROCEDURE — 84132 ASSAY OF SERUM POTASSIUM: CPT | Performed by: COLON & RECTAL SURGERY

## 2023-03-14 PROCEDURE — 84100 ASSAY OF PHOSPHORUS: CPT | Performed by: COLON & RECTAL SURGERY

## 2023-03-14 PROCEDURE — 250N000011 HC RX IP 250 OP 636: Performed by: STUDENT IN AN ORGANIZED HEALTH CARE EDUCATION/TRAINING PROGRAM

## 2023-03-14 PROCEDURE — 83735 ASSAY OF MAGNESIUM: CPT | Performed by: COLON & RECTAL SURGERY

## 2023-03-14 PROCEDURE — 36415 COLL VENOUS BLD VENIPUNCTURE: CPT | Performed by: COLON & RECTAL SURGERY

## 2023-03-14 PROCEDURE — 250N000013 HC RX MED GY IP 250 OP 250 PS 637: Performed by: COLON & RECTAL SURGERY

## 2023-03-14 PROCEDURE — 250N000013 HC RX MED GY IP 250 OP 250 PS 637: Performed by: STUDENT IN AN ORGANIZED HEALTH CARE EDUCATION/TRAINING PROGRAM

## 2023-03-14 RX ORDER — POLYETHYLENE GLYCOL 3350 17 G/17G
17 POWDER, FOR SOLUTION ORAL DAILY PRN
Qty: 510 G | Refills: 0 | Status: SHIPPED | OUTPATIENT
Start: 2023-03-14 | End: 2023-04-13

## 2023-03-14 RX ORDER — METHOCARBAMOL 500 MG/1
500 TABLET, FILM COATED ORAL 3 TIMES DAILY PRN
Qty: 30 TABLET | Refills: 0 | Status: SHIPPED | OUTPATIENT
Start: 2023-03-14

## 2023-03-14 RX ORDER — POTASSIUM CHLORIDE 750 MG/1
40 TABLET, EXTENDED RELEASE ORAL ONCE
Status: COMPLETED | OUTPATIENT
Start: 2023-03-14 | End: 2023-03-14

## 2023-03-14 RX ORDER — ONDANSETRON 4 MG/1
4 TABLET, ORALLY DISINTEGRATING ORAL EVERY 8 HOURS PRN
Qty: 15 TABLET | Refills: 0 | Status: SHIPPED | OUTPATIENT
Start: 2023-03-14 | End: 2023-03-21

## 2023-03-14 RX ORDER — OXYCODONE HYDROCHLORIDE 5 MG/1
5-10 TABLET ORAL EVERY 4 HOURS PRN
Qty: 20 TABLET | Refills: 0 | Status: SHIPPED | OUTPATIENT
Start: 2023-03-14

## 2023-03-14 RX ORDER — ACETAMINOPHEN 500 MG
1000 TABLET ORAL 4 TIMES DAILY
Qty: 30 TABLET | Refills: 0 | Status: SHIPPED | OUTPATIENT
Start: 2023-03-14

## 2023-03-14 RX ORDER — ENOXAPARIN SODIUM 100 MG/ML
30 INJECTION SUBCUTANEOUS EVERY 12 HOURS
Qty: 14.4 ML | Refills: 0 | Status: SHIPPED | OUTPATIENT
Start: 2023-03-14 | End: 2023-04-07

## 2023-03-14 RX ADMIN — LEVOTHYROXINE SODIUM 150 MCG: 0.15 TABLET ORAL at 08:08

## 2023-03-14 RX ADMIN — POTASSIUM CHLORIDE 40 MEQ: 750 TABLET, EXTENDED RELEASE ORAL at 10:04

## 2023-03-14 RX ADMIN — ACETAMINOPHEN 1000 MG: 500 TABLET ORAL at 06:11

## 2023-03-14 RX ADMIN — ENOXAPARIN SODIUM 30 MG: 30 INJECTION SUBCUTANEOUS at 08:09

## 2023-03-14 RX ADMIN — ACETAMINOPHEN 1000 MG: 500 TABLET ORAL at 10:04

## 2023-03-14 RX ADMIN — POLYETHYLENE GLYCOL 3350 17 G: 17 POWDER, FOR SOLUTION ORAL at 08:09

## 2023-03-14 ASSESSMENT — ACTIVITIES OF DAILY LIVING (ADL)
ADLS_ACUITY_SCORE: 21

## 2023-03-14 NOTE — PLAN OF CARE
Goal Outcome Evaluation:          4xA/O. Ambulating to bathroom and shower. Pt initiated ostomy care, passing gas. Nausea this morning, ondansetron on discharge meds. Voiding adequately, no stool in ostomy yet. Pain managed by tylenol and Robaxin. Incision shows no signs of infection. Provider approved discharge. Patient and daughter education upon discharge.

## 2023-03-14 NOTE — PLAN OF CARE
"/66 (BP Location: Right arm)   Pulse 71   Temp 98.6  F (37  C) (Oral)   Resp 18   Ht 1.854 m (6' 1\")   Wt (!) 151.7 kg (334 lb 7 oz)   SpO2 91%   BMI 44.12 kg/m        Assumed care 1500 to 2330  Pt rounded on hourly  Nash: alert and oriented   Pain: pt has abd incisional pain. Scheduled tylenol given   GI/: Denies nausea. Bowel sounds present. Good urine output  yellow urine with reddish tinge. Pt ostomy passing gas but no BM.   Cardiac: WDL  Respiratory: denies SOB lung sounds clear bilaterally  Skin:midline abd incision clean dry and intact. LLQ ostomy no BM. Pt cares for ostomy  Lines: No PIV access. Provider paged   Assist level: SBA pt went on 3 more walks this shift.   Will continue to monitor and follow plan of care.   Plan: Possible discharge tomorrow if pt has a BM    Call light in reach, Pt able to make needs known, Will continue to monitor and follow plan of care.     Goal Outcome Evaluation:     Plan of Care Reviewed With: pt     Overall Patient Progress: Needs to have BM     Outcome Evaluation: Possible discharge tomorrow if pt has a BM    "

## 2023-03-14 NOTE — PLAN OF CARE
Goal Outcome Evaluation:      Plan of Care Reviewed With: patient    Overall Patient Progress: no changeOverall Patient Progress: no change    Outcome Evaluation: Pt A&Ox4, VSS on RA. Per pt, up to bathroom x5 last night, no bm yet. No PIV - have requested PO n/v med as pt does not want PIV again. If she has a bm, may go home today.

## 2023-03-15 NOTE — DISCHARGE SUMMARY
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  Cystoscopy with bilateral ureteral stents (per Dr. Connolly).    2.  Open excision of pelvic floor recurrence and excision of pelvic nodules.  3.  Right pelvic lymph node dissection.  4.  Omental pedicle flap.            Consultations:   PHYSICAL THERAPY ADULT IP CONSULT  CARE MANAGEMENT / SOCIAL WORK IP CONSULT  WOUND OSTOMY CONTINENCE NURSE  IP CONSULT  CARE MANAGEMENT / SOCIAL WORK IP CONSULT  UROLOGY IP CONSULT  NURSING TO CONSULT FOR VASCULAR ACCESS CARE IP CONSULT         Imaging Studies:     Results for orders placed or performed during the hospital encounter of 03/09/23   POC US Guidance Needle Placement    Impression    Bilateral TAP block.   US Renal Complete Non-Vascular    Narrative    EXAM: US Renal Complete, 3/11/2023 12:35 PM.    HISTORY: Postop. Assess both kidney but please look for hydro on right  side..    COMPARISON: Pelvic MR 1/10/2023. PET/CT of 1/30/2022.    TECHNIQUE: The kidneys and bladder were scanned in the standard  fashion with specialized ultrasound transducer(s) using both gray  scale and limited color/spectral Doppler techniques.    FINDINGS:    Right kidney: 13.6 cm in length. No shadowing stones. hydronephrosis  or hydroureter. No cystic lesion or mass.   Left  kidney: 12.7 cm in length. No shadowing stones. hydronephrosis or  hydroureter. Cyst in the superior pole measuring 4.2 x 4.0 x 2.9 cm as  seen on prior PET/CT 11/30/2022.     Bladder: Obscured by overlying bowel gas.      Impression    IMPRESSION:   No hydronephrosis neither kidney. Stable left superior pole renal  cyst.    I have personally reviewed the examination and initial interpretation  and I agree with the findings.    LUCY VERNON MD         SYSTEM ID:  [id number]              Medications Prior to Admission:     No medications prior to admission.              Discharge Medications:     Discharge Medication List as of 3/14/2023  8:58 AM      START taking these medications    Details   ondansetron (ZOFRAN ODT) 4 MG ODT tab Take 1 tablet (4 mg) by mouth every 8 hours as needed for nausea, Disp-15 tablet, R-0, E-Prescribe      acetaminophen (TYLENOL) 500 MG tablet Take 2 tablets (1,000 mg) by mouth 4 times daily, No Print Out      methocarbamol (ROBAXIN) 500 MG tablet Take 1 tablet (500 mg) by mouth 3 times daily as needed for muscle spasms, Disp-30 tablet, R-0, No Print Out      polyethylene glycol (MIRALAX) 17 GM/Dose powder Take 17 g by mouth daily, Disp-510 g, No Print Out         CONTINUE these medications which have CHANGED    Details   enoxaparin ANTICOAGULANT (LOVENOX) 30 MG/0.3ML syringe Inject 0.3 mLs (30 mg) Subcutaneous every 12 hours for 24 days, Disp-14.4 mL, R-0, E-Prescribe      oxyCODONE (ROXICODONE) 5 MG tablet Take 1-2 tablets (5-10 mg) by mouth every 4 hours as needed for pain, Disp-20 tablet, R-0, Local Print         CONTINUE these medications which have NOT CHANGED    Details   levothyroxine (SYNTHROID/LEVOTHROID) 150 MCG tablet Take 1 tablet by mouth every morning, Historical                      Brief History of Illness:   Sarah Portillo is a 57 year old female who presents today for a recurrence of her rectal adenocarcinoma. She was diagnosed with a [id number] moderally differentiated  distal rectal adenocarcinoma in October of 2020. MMR intact. She underwent JULIA with FOLFOX6 x 8 cycles followed by long course chemoradiotherapy. She sequently underwent abdominoperineal resection on 8/9/2021 by Dr. Chava Rosario in [place].  Postoperative recovery was largely uneventful.  Final pathology showed a 2.5 cm tumor with negative surgical margins (closest margin was 3.0 cm) with 0 out of 24 lymph nodes. During follow-up, she was diagnosed with a local recurrence in November 2022. CT guided biopsy demonstrated metastatic adenocaricnoma consistent with colorectal primary in December 2022. PD-L1 expressed in the setting of normal MMR expression was intermediate, Pan -TRK not expressed, HER 2 negative, and FISH analysis negative. She had a MRI pelvis on 1/10/2023 which revealed a right posterior pelvis sidewall/pelvic floor recurrence measuring 2.7 x 2.0 x 3.4cm with tethering of the cervix. PET scan on 1/13/2023 demonstrated a right posterolateral pelvic sidewall soft tissue mass measuring 2.2 cm with FDG update. Last CEA in November 2022 was 2.6.      Sarah has a history of DVT and pulmonary embolism.  She was on warfarin for approximately 1 year and did undergo placement of an IVC filter for surgery but this has been since removed.  She is not on any anticoagulation at this time.              Hospital Course:   Post-operatively pt was gently fluid resuscitated.  Penn was removed and pt was able to void, althouhg she did have some pink urine, urology felt it was safe to remove her penn.  Pt had eventual return of bowel function and was able to tolerate small amounts of a low fiber diet.     Pt was seen by [facility] and ensured she knew how to manage her established ostomy. Pt was seen by PT/OT and deemed appropriate for discharge home.  Pt was taught how to self-inject post-operative prophylactic lovenox for which he/she is to do for a total of 30 days. Post-operative pain was controlled with scheduled tylenol,  "ibuprofen, gabapentin, robaxin and prn oxycodone/dilaudid.       Patient is to follow up in the [facility] in 2-3 week with Jaclyn Torrez NP or Diane Garrison PA-C and then with Dr. Gomez in 2-3 weeks after.          Day of Discharge Physical Exam:   Blood pressure 133/68, pulse 58, temperature 98.1  F (36.7  C), temperature source Oral, resp. rate 16, height 1.854 m (6' 1\"), weight (!) 151.7 kg (334 lb 7 oz), SpO2 95 %, not currently breastfeeding.    Gen:      AAOx3, NAD  Pulm:    Non-labored breathing on RA  Abd:      Soft, non-distended, appropriately tender, no guarding. Abdominal binder in place               Incision C/D/I closed with dermabond.  Ostomy pink and viable with gas in bag  Ext:        warm and well-perfused         Final Pathology Result:   Pending at time of discharge (preliminary)           Discharge Instructions and Follow-Up:     Discharge Procedure Orders   Brief Discharge Instructions   Order Comments: Discharge instructions from gynecology:  - Pelvic rest for 8 weeks: - No tampons, douching, intercourse   - No swimming/submerging waist underwater for 8 weeks  - May have spotting or light vaginal bleeding for a few weeks. If heavy (bleeding more than a pad an hour) call doctor or go to emergency room     When to contact your care team   Order Comments: GENERAL Gynecologic Oncology POST-OPERATIVE  PATIENT INSTRUCTIONS FOLLOW-UP:    Call the clinic or go to the ED if you have:  Temperature greater than 100.4  Persistent nausea and vomiting  Severe uncontrolled pain  Redness, tenderness, or signs of infection (pain, swelling, redness, odor or green/yellow discharge around the site)  Difficulty breathing, headache or visual disturbances  Hives  Persistent dizziness or light-headedness  Extreme fatigue  Heavy vaginal bleeding  Any other questions or concerns you may have after discharge    In an emergency, call 911 or go to an Emergency Department at a nearby " hospital    ACTIVITY:  You are encouraged to cough, deep breath and use your incentive spirometer if you were given one, every 15-30 minutes when awake.  This will help prevent respiratory complications and low grade fevers post-operatively.  You may want to hug a pillow when coughing and sneezing to add additional support to the surgical area, if you had abdominal surgery, which will decrease pain during these times.      1.  No heavy lifting >10 lbs or strenuous exercise for six-eight weeks.  No exercise in which you are using core muscles (yoga, pilates, swimming, weight lifting)  2.  You may walk as much as you wish.  You are encouraged to increase your activity each day after surgery.  Stairs are okay.   3.  Nothing per vagina for eight weeks.  No tampons, no intercourse, no douching.  You can expect some light vaginal spotting and discharge for up to six weeks.  If bleeding becomes heavy, please contact the office.     QUESTIONS:  Please feel free to call your physician or the hospital  if you have any questions, and they will be glad to assist you.     Reason for your hospital stay   Order Comments: Open excision of pelvic floor recurrence of colon cancer     Activity   Order Comments: Your activity upon discharge: activity as tolerated     Order Specific Question Answer Comments   Is discharge order? Yes      Follow Up ([facility])   Order Comments: You will have follow-up in clinic with our clinic nurses in 1-2 week(s) and then Dr. Long Gomez in 4-5 weeks.     Appointments on [place] and/or [facility] (with [facility] or [facility] provider or service). Call [phone] if you haven't heard regarding these appointments within 7 days of discharge.     Diet   Order Comments: Follow this diet upon discharge: Orders Placed This Encounter      Low Fiber Diet     Order Specific Question Answer Comments   Is discharge order? Yes             Home Health Care:     Not needed           Discharge  Disposition:     Discharged to home      Condition at discharge: Stable    Mich Lima MD  Intern, General Surgery  Colorectal Intern     Pt was seen and discussed with Dr. Oquendo, who discussed with Dr. Gomez on 3/14/2024

## 2023-03-15 NOTE — PLAN OF CARE
Physical Therapy Discharge Summary    Reason for therapy discharge:    Discharged to home.    Progress towards therapy goal(s). See goals on Care Plan in [facility] electronic health record for goal details.  Goals met    Therapy recommendation(s):    No further therapy is recommended.

## 2023-03-16 ENCOUNTER — PATIENT OUTREACH (OUTPATIENT)
Dept: SURGERY | Facility: CLINIC | Age: 58
End: 2023-03-16
Payer: COMMERCIAL

## 2023-03-16 NOTE — PROGRESS NOTES
Post Op Note     Called to check on patient postoperatively after hospital discharge.     Patient is s/p Open excision of pelvic floor recurrence and pelvic nodules, R pelvic LN dissection, DOT/BSO, cytoscopy with b/l stents with Dr. Long Gomez for rectal cancer recurrence.   Admitted 3/9 and discharged on 3/14.      Pain is well controlled with tylenol, oxy, robaxin     Patient is eating and drinking normally. Patient is on a low fiber diet.  Encouraged patient to drink 8-10 glasses of water a day.     Colostomy: She had output for the first two days but nothing today. I encouraged miralax if she continues to not have output     Patient is voiding normally and urine is light in color.    Patient is not set up with home care.     Patient Denies nausea and vomiting.    Patient Denies any fevers or chills.    Patient's incision is C/D/I. Patient reminded NOT to remove any dressings over their incisions.     Labs: scheduled Wed or Thurs next week. She said her clinic told her to come in one of those days at any time     Patient is on a activity restriction. Lifting 10 pounds for 6 weeks.     Patient Reports needing any forms completed. She needs her forms revised and will reach out to Alethea     Follow up is set up with Diane Garrison PA-C on 3/29 and with Dr. Long Gomez on 5/1.   Encouraged the patient to contact the clinic in the meantime with any fevers, chills, nausea, vomiting, increased colostomy output, no colostomy output, dizziness, lightheadedness, uncontrolled pain, changes to the incisions, or with any questions or concerns.    Patient's questions were answered to their stated satisfaction and they are in agreement with this plan.    JAMIE Zambrano [phone]  Colon & Rectal Surgery Clinic  [facility]

## 2023-03-17 LAB
PATH REPORT.COMMENTS IMP SPEC: NORMAL
PATH REPORT.COMMENTS IMP SPEC: NORMAL
PATH REPORT.FINAL DX SPEC: NORMAL
PATH REPORT.GROSS SPEC: NORMAL
PATH REPORT.INTRAOP OBS SPEC DOC: NORMAL
PATH REPORT.MICROSCOPIC SPEC OTHER STN: NORMAL
PATH REPORT.RELEVANT HX SPEC: NORMAL
PHOTO IMAGE: NORMAL

## 2023-03-22 ENCOUNTER — MYC MEDICAL ADVICE (OUTPATIENT)
Dept: SURGERY | Facility: CLINIC | Age: 58
End: 2023-03-22
Payer: COMMERCIAL

## 2023-03-22 ENCOUNTER — TRANSFERRED RECORDS (OUTPATIENT)
Dept: HEALTH INFORMATION MANAGEMENT | Facility: CLINIC | Age: 58
End: 2023-03-22
Payer: COMMERCIAL

## 2023-03-22 LAB
CREATININE (EXTERNAL): 0.75 MG/DL (ref 0.4–1.1)
GFR ESTIMATED (EXTERNAL): 93 ML/MIN/1.73M2
GLUCOSE (EXTERNAL): 73 MG/DL (ref 70–99)
POTASSIUM (EXTERNAL): 3.8 MMOL/L (ref 3.4–5)

## 2023-03-24 ENCOUNTER — TELEPHONE (OUTPATIENT)
Dept: SURGERY | Facility: CLINIC | Age: 58
End: 2023-03-24
Payer: COMMERCIAL

## 2023-03-24 NOTE — TELEPHONE ENCOUNTER
Sarah called and left a voicemail asking to cancel 03/29 post-op as it is not needed per the provider. Appointment had been canceled per Alanisharlisa once this message had been received. Nothing else is needed.

## 2023-03-28 NOTE — PROGRESS NOTES
Addendum to Discharge Summary dated 3/14/2023    # Adenocarcinoma of Right pelvic nodules   added to discharge diagnoses    Mich Lima MD

## 2023-04-25 ENCOUNTER — TELEPHONE (OUTPATIENT)
Dept: UROLOGY | Facility: CLINIC | Age: 58
End: 2023-04-25
Payer: COMMERCIAL

## 2023-04-25 ENCOUNTER — TELEPHONE (OUTPATIENT)
Dept: SURGERY | Facility: CLINIC | Age: 58
End: 2023-04-25
Payer: COMMERCIAL

## 2023-04-25 NOTE — TELEPHONE ENCOUNTER
Patient called in, canceled her Post-op appt with Dr. Gomez on 5/1/2023 since she's feeling better, and she says that Dr. Gomez said that she could cancel if she's feeling better.     She also asked to be transferred to Ines Hdez RN, so I transferred the call.    Dorita Ross  Melisa-op Coordinator  [place]  Direct Phone: [phone]

## 2023-04-25 NOTE — TELEPHONE ENCOUNTER
[facility] Call Center    Phone Message    May a detailed message be left on voicemail: yes     Reason for Call: Other: .  Pt calling regarding recent procedure she had with Philip in March. PT has follow up questions and would like to speak with a care team member. Please call pt     Action Taken: Message routed to:  Other: Uro    Travel Screening: Not Applicable

## 2023-04-25 NOTE — TELEPHONE ENCOUNTER
Spoke to pt. Pt lives in [place]. She denies any concerns to follow up on and cancelled follow up appointments here. She plans to follow up with local providers in [place] instead. Sending update to provider.     Tiffany Rdedy, MSN RN

## 2024-03-10 ENCOUNTER — HEALTH MAINTENANCE LETTER (OUTPATIENT)
Age: 59
End: 2024-03-10

## 2025-02-16 ENCOUNTER — HEALTH MAINTENANCE LETTER (OUTPATIENT)
Age: 60
End: 2025-02-16

## 2025-03-16 ENCOUNTER — HEALTH MAINTENANCE LETTER (OUTPATIENT)
Age: 60
End: 2025-03-16

## (undated) DEVICE — ESU LIGASURE LAPAROSCOPIC BLUNT TIP SEALER 5MMX44CM LF1844

## (undated) DEVICE — WIPES FOLEY CARE SURESTEP PROVON DFC100

## (undated) DEVICE — JELLY LUBRICATING SURGILUBE 2OZ TUBE

## (undated) DEVICE — NDL ANGIOCATH 14GA 1.25" 4048

## (undated) DEVICE — SU SILK 4-0 RB-1 CR 8X18" C054D

## (undated) DEVICE — SU VICRYL 3-0 SH 27" UND J416H

## (undated) DEVICE — PREP SKIN SCRUB TRAY 4461A

## (undated) DEVICE — PREP CHLORAPREP 26ML TINTED HI-LITE ORANGE 930815

## (undated) DEVICE — GUIDEWIRE SENSOR DUAL FLEX STR 0.035"X150CM M0066703080

## (undated) DEVICE — SUCTION MANIFOLD NEPTUNE 2 SYS 4 PORT 0702-020-000

## (undated) DEVICE — SPONGE KITTNER 30-101

## (undated) DEVICE — SU PROLENE 1 CTX 30" 8455H

## (undated) DEVICE — ESU CLEANER TIP 31142717

## (undated) DEVICE — ESU PENCIL SMOKE EVAC W/ROCKER SWITCH 0703-047-000

## (undated) DEVICE — SU VICRYL 0 TIE 54" J608H

## (undated) DEVICE — KIT NEW IMAGE COLOSTOMY/ILEOSTOMY 2 3/4" LF 19354

## (undated) DEVICE — BARRIER SEPRAFILM 3X5" X6 SHEETS 5086-02

## (undated) DEVICE — PACK CYSTO UMMC CUSTOM

## (undated) DEVICE — DRAPE SHEET REV FOLD 3/4 9349

## (undated) DEVICE — LINEN TOWEL PACK X6 WHITE 5487

## (undated) DEVICE — Device

## (undated) DEVICE — SOL ADH LIQUID BENZOIN SWAB 0.6ML C1544

## (undated) DEVICE — SU PROLENE 2-0 SHDA 36" 8523H

## (undated) DEVICE — PAD CHUX UNDERPAD 23X24" 7136

## (undated) DEVICE — SOL WATER IRRIG 1000ML BOTTLE 2F7114

## (undated) DEVICE — PACK AB HYST II

## (undated) DEVICE — SU VICRYL 0 CT-2 27" J334H

## (undated) DEVICE — SU SILK 3-0 TIE 12X30" A304H

## (undated) DEVICE — LINEN TOWEL PACK X30 5481

## (undated) DEVICE — LINEN TOWEL PACK X5 5464

## (undated) DEVICE — BNDG ABDOMINAL BINDER 9X62-84" 79-89210

## (undated) DEVICE — CLIP HORIZON LG ORANGE 004200

## (undated) DEVICE — SU SILK 2-0 TIE 12X30" A305H

## (undated) DEVICE — SU MONOCRYL 4-0 PS-2 27" UND Y426H

## (undated) DEVICE — SU SILK 0 TIE 6X30" A306H

## (undated) DEVICE — SU VICRYL 0 CT-1 27" UND J260H

## (undated) DEVICE — DRAPE U SPLIT 74X120" 29440

## (undated) DEVICE — VESSEL LOOPS YELLOW MAXI 31145694

## (undated) DEVICE — DRAPE IOBAN INCISE 23X17" 6650EZ

## (undated) DEVICE — DRAPE LEGGINGS CLEAR 8430

## (undated) DEVICE — CATH URETERAL OPEN END 05FR 70CM 020015

## (undated) DEVICE — PACK SET-UP STD 9102

## (undated) DEVICE — CATH TRAY FOLEY SURESTEP 16FR W/URNE MTR STLK LATEX A303316A

## (undated) DEVICE — LINEN GOWN XLG 5407

## (undated) DEVICE — SU VICRYL 2-0 CT-2 27" UND J269H

## (undated) DEVICE — TUBING IRRIG CYSTO/BLADDER SET 81" LF 2C4040

## (undated) RX ORDER — ONDANSETRON 2 MG/ML
INJECTION INTRAMUSCULAR; INTRAVENOUS
Status: DISPENSED
Start: 2023-03-09

## (undated) RX ORDER — DEXAMETHASONE SODIUM PHOSPHATE 4 MG/ML
INJECTION, SOLUTION INTRA-ARTICULAR; INTRALESIONAL; INTRAMUSCULAR; INTRAVENOUS; SOFT TISSUE
Status: DISPENSED
Start: 2023-03-09

## (undated) RX ORDER — FENTANYL CITRATE-0.9 % NACL/PF 10 MCG/ML
PLASTIC BAG, INJECTION (ML) INTRAVENOUS
Status: DISPENSED
Start: 2023-03-09

## (undated) RX ORDER — ENOXAPARIN SODIUM 100 MG/ML
INJECTION SUBCUTANEOUS
Status: DISPENSED
Start: 2023-03-09

## (undated) RX ORDER — CELECOXIB 200 MG/1
CAPSULE ORAL
Status: DISPENSED
Start: 2023-03-09

## (undated) RX ORDER — FENTANYL CITRATE 50 UG/ML
INJECTION, SOLUTION INTRAMUSCULAR; INTRAVENOUS
Status: DISPENSED
Start: 2023-03-09

## (undated) RX ORDER — PROPOFOL 10 MG/ML
INJECTION, EMULSION INTRAVENOUS
Status: DISPENSED
Start: 2023-03-09

## (undated) RX ORDER — GRANISETRON HYDROCHLORIDE 1 MG/ML
INJECTION INTRAVENOUS
Status: DISPENSED
Start: 2023-03-09

## (undated) RX ORDER — ERTAPENEM 1 G/1
INJECTION, POWDER, LYOPHILIZED, FOR SOLUTION INTRAMUSCULAR; INTRAVENOUS
Status: DISPENSED
Start: 2023-03-09

## (undated) RX ORDER — GLYCOPYRROLATE 0.2 MG/ML
INJECTION, SOLUTION INTRAMUSCULAR; INTRAVENOUS
Status: DISPENSED
Start: 2023-03-09